# Patient Record
Sex: MALE | Race: WHITE | NOT HISPANIC OR LATINO
[De-identification: names, ages, dates, MRNs, and addresses within clinical notes are randomized per-mention and may not be internally consistent; named-entity substitution may affect disease eponyms.]

---

## 2018-08-01 ENCOUNTER — APPOINTMENT (OUTPATIENT)
Dept: UROLOGY | Facility: CLINIC | Age: 58
End: 2018-08-01
Payer: COMMERCIAL

## 2018-08-01 VITALS
BODY MASS INDEX: 30.1 KG/M2 | HEIGHT: 71 IN | HEART RATE: 69 BPM | DIASTOLIC BLOOD PRESSURE: 85 MMHG | SYSTOLIC BLOOD PRESSURE: 130 MMHG | WEIGHT: 215 LBS

## 2018-08-01 DIAGNOSIS — Z78.9 OTHER SPECIFIED HEALTH STATUS: ICD-10-CM

## 2018-08-01 PROCEDURE — 99213 OFFICE O/P EST LOW 20 MIN: CPT

## 2019-04-24 PROBLEM — Z00.00 ENCOUNTER FOR PREVENTIVE HEALTH EXAMINATION: Noted: 2019-04-24

## 2019-09-04 ENCOUNTER — APPOINTMENT (OUTPATIENT)
Dept: CARDIOLOGY | Facility: CLINIC | Age: 59
End: 2019-09-04
Payer: COMMERCIAL

## 2019-09-04 PROCEDURE — 93000 ELECTROCARDIOGRAM COMPLETE: CPT

## 2019-09-04 PROCEDURE — 99214 OFFICE O/P EST MOD 30 MIN: CPT

## 2019-09-10 ENCOUNTER — APPOINTMENT (OUTPATIENT)
Dept: UROLOGY | Facility: CLINIC | Age: 59
End: 2019-09-10
Payer: COMMERCIAL

## 2019-09-10 VITALS
SYSTOLIC BLOOD PRESSURE: 131 MMHG | BODY MASS INDEX: 29.4 KG/M2 | WEIGHT: 210 LBS | HEART RATE: 75 BPM | HEIGHT: 71 IN | DIASTOLIC BLOOD PRESSURE: 71 MMHG

## 2019-09-10 PROCEDURE — 99213 OFFICE O/P EST LOW 20 MIN: CPT

## 2019-09-10 NOTE — PHYSICAL EXAM
[General Appearance - In No Acute Distress] : no acute distress [Prostate Tenderness] : the prostate was not tender [No Prostate Nodules] : no prostate nodules [Prostate Size ___ (0-4)] : prostate size [unfilled] (scale: 0-4) [] : no respiratory distress [Respiration, Rhythm And Depth] : normal respiratory rhythm and effort [Oriented To Time, Place, And Person] : oriented to person, place, and time [Affect] : the affect was normal [Mood] : the mood was normal [Normal Station and Gait] : the gait and station were normal for the patient's age

## 2019-09-10 NOTE — REVIEW OF SYSTEMS
[Fever] : no fever [Chest Pain] : no chest pain [Shortness Of Breath] : no shortness of breath [Abdominal Pain] : no abdominal pain [Dizziness] : no dizziness [Dysuria] : no dysuria

## 2019-12-20 ENCOUNTER — OUTPATIENT (OUTPATIENT)
Dept: OUTPATIENT SERVICES | Facility: HOSPITAL | Age: 59
LOS: 1 days | Discharge: HOME | End: 2019-12-20
Payer: COMMERCIAL

## 2019-12-20 DIAGNOSIS — F17.200 NICOTINE DEPENDENCE, UNSPECIFIED, UNCOMPLICATED: ICD-10-CM

## 2019-12-20 PROCEDURE — 71250 CT THORAX DX C-: CPT | Mod: 26

## 2020-04-08 ENCOUNTER — APPOINTMENT (OUTPATIENT)
Dept: CARDIOLOGY | Facility: CLINIC | Age: 60
End: 2020-04-08
Payer: COMMERCIAL

## 2020-04-08 ENCOUNTER — APPOINTMENT (OUTPATIENT)
Dept: CARDIOLOGY | Facility: CLINIC | Age: 60
End: 2020-04-08

## 2020-04-08 PROCEDURE — 99213 OFFICE O/P EST LOW 20 MIN: CPT | Mod: 95

## 2020-07-28 ENCOUNTER — RECORD ABSTRACTING (OUTPATIENT)
Age: 60
End: 2020-07-28

## 2020-07-28 DIAGNOSIS — R07.89 OTHER CHEST PAIN: ICD-10-CM

## 2020-07-28 DIAGNOSIS — Z78.9 OTHER SPECIFIED HEALTH STATUS: ICD-10-CM

## 2020-07-28 DIAGNOSIS — I65.23 OCCLUSION AND STENOSIS OF BILATERAL CAROTID ARTERIES: ICD-10-CM

## 2020-08-26 ENCOUNTER — APPOINTMENT (OUTPATIENT)
Dept: CARDIOLOGY | Facility: CLINIC | Age: 60
End: 2020-08-26
Payer: COMMERCIAL

## 2020-08-26 VITALS
DIASTOLIC BLOOD PRESSURE: 78 MMHG | HEART RATE: 51 BPM | TEMPERATURE: 98.4 F | HEIGHT: 71 IN | WEIGHT: 217 LBS | OXYGEN SATURATION: 96 % | BODY MASS INDEX: 30.38 KG/M2 | SYSTOLIC BLOOD PRESSURE: 110 MMHG | RESPIRATION RATE: 16 BRPM

## 2020-08-26 PROCEDURE — 99214 OFFICE O/P EST MOD 30 MIN: CPT

## 2020-08-26 PROCEDURE — 93000 ELECTROCARDIOGRAM COMPLETE: CPT

## 2020-08-26 NOTE — HISTORY OF PRESENT ILLNESS
[FreeTextEntry1] : 59 year old male came in foron going evaluation of hypertension and hyperlipidemia. Patient denies chest pains or dyspnea.\par \par \par PMH is signfiicant for:\par \par HTN - controlled\par Hyperlipidemia - controlled\par \par Medications reviewed and reconciled with patient.  Patient is compliant with taking appropriate medications at appropriate doses at appropriate intervals without missing doses.\par

## 2020-08-26 NOTE — DISCUSSION/SUMMARY
[___ Month(s)] : [unfilled] month(s) [With Me] : with me [FreeTextEntry1] : Ekg results were reviewed.\par Recent lab results were reviewed.\par \par Fasting CBC, BMP, LFTs, Lipid Profile, HgbA1c, CRP, UA, Urine Microalbumin, Mg, Ca, TSH, T3, T4 prior to next visit\par f/u 6 months\par

## 2020-08-26 NOTE — PHYSICAL EXAM
[Normal Appearance] : normal appearance [General Appearance - Well Developed] : well developed [Well Groomed] : well groomed [General Appearance - Well Nourished] : well nourished [No Deformities] : no deformities [General Appearance - In No Acute Distress] : no acute distress [Normal Conjunctiva] : the conjunctiva exhibited no abnormalities [Eyelids - No Xanthelasma] : the eyelids demonstrated no xanthelasmas [Normal Oral Mucosa] : normal oral mucosa [No Oral Pallor] : no oral pallor [No Oral Cyanosis] : no oral cyanosis [Normal Jugular Venous A Waves Present] : normal jugular venous A waves present [Normal Jugular Venous V Waves Present] : normal jugular venous V waves present [No Jugular Venous Berry A Waves] : no jugular venous berry A waves [Respiration, Rhythm And Depth] : normal respiratory rhythm and effort [Exaggerated Use Of Accessory Muscles For Inspiration] : no accessory muscle use [5th Left ICS - MCL] : palpated at the 5th LICS in the midclavicular line [Auscultation Breath Sounds / Voice Sounds] : lungs were clear to auscultation bilaterally [Normal Rate] : normal [Normal S1] : normal S1 [Normal S2] : normal S2 [No Murmur] : no murmurs heard [2+] : left 2+ [No Pitting Edema] : no pitting edema present [No Abnormalities] : the abdominal aorta was not enlarged and no bruit was heard [Abdomen Tenderness] : non-tender [Abdomen Soft] : soft [Abdomen Mass (___ Cm)] : no abdominal mass palpated [Gait - Sufficient For Exercise Testing] : the gait was sufficient for exercise testing [Abnormal Walk] : normal gait [Nail Clubbing] : no clubbing of the fingernails [Petechial Hemorrhages (___cm)] : no petechial hemorrhages [Cyanosis, Localized] : no localized cyanosis [] : no rash [Skin Color & Pigmentation] : normal skin color and pigmentation [No Venous Stasis] : no venous stasis [Skin Lesions] : no skin lesions [No Skin Ulcers] : no skin ulcer [Oriented To Time, Place, And Person] : oriented to person, place, and time [Affect] : the affect was normal [No Xanthoma] : no  xanthoma was observed [Mood] : the mood was normal [No Anxiety] : not feeling anxious [Right Carotid Bruit] : no bruit heard over the right carotid [S4] : no S4 [S3] : no S3 [Left Carotid Bruit] : no bruit heard over the left carotid [Right Femoral Bruit] : no bruit heard over the right femoral artery [Left Femoral Bruit] : no bruit heard over the left femoral artery

## 2020-09-15 ENCOUNTER — APPOINTMENT (OUTPATIENT)
Dept: UROLOGY | Facility: CLINIC | Age: 60
End: 2020-09-15
Payer: COMMERCIAL

## 2020-09-15 VITALS — HEIGHT: 71 IN | TEMPERATURE: 97.6 F | WEIGHT: 218 LBS | BODY MASS INDEX: 30.52 KG/M2

## 2020-09-15 PROCEDURE — 99213 OFFICE O/P EST LOW 20 MIN: CPT

## 2020-09-15 NOTE — HISTORY OF PRESENT ILLNESS
[None] : None [FreeTextEntry1] : 60 year old male with a history of BPH. Presents to the office for a yearly follow up and prostate check. Last year PSA was 0.6 ng/ml. Voiding with a good flow, satisfied with urinary condition. Nocturia 1-2  xa night depending on his late fluid intake. Denies gross hematuria, dysuria, flank pain, fever, or chills.

## 2020-09-15 NOTE — PHYSICAL EXAM
[General Appearance - Well Developed] : well developed [General Appearance - Well Nourished] : well nourished [Normal Appearance] : normal appearance [Well Groomed] : well groomed [General Appearance - In No Acute Distress] : no acute distress [Abdomen Soft] : soft [Abdomen Tenderness] : non-tender [Costovertebral Angle Tenderness] : no ~M costovertebral angle tenderness [Edema] : no peripheral edema [] : no respiratory distress [Respiration, Rhythm And Depth] : normal respiratory rhythm and effort [Exaggerated Use Of Accessory Muscles For Inspiration] : no accessory muscle use [Affect] : the affect was normal [Oriented To Time, Place, And Person] : oriented to person, place, and time [Mood] : the mood was normal [Not Anxious] : not anxious [Normal Station and Gait] : the gait and station were normal for the patient's age [No Focal Deficits] : no focal deficits [Prostate Tenderness] : the prostate was not tender [No Prostate Nodules] : no prostate nodules [Prostate Size ___ (0-4)] : prostate size [unfilled] (scale: 0-4)

## 2020-09-15 NOTE — ASSESSMENT
[FreeTextEntry1] : Stable BPH requiring no treatment. Will get a new PSA, patient will call for results. Follow up in 1 year.

## 2021-02-25 ENCOUNTER — APPOINTMENT (OUTPATIENT)
Dept: CARDIOLOGY | Facility: CLINIC | Age: 61
End: 2021-02-25

## 2021-03-25 ENCOUNTER — APPOINTMENT (OUTPATIENT)
Dept: CARDIOLOGY | Facility: CLINIC | Age: 61
End: 2021-03-25
Payer: COMMERCIAL

## 2021-03-25 VITALS — DIASTOLIC BLOOD PRESSURE: 80 MMHG | SYSTOLIC BLOOD PRESSURE: 120 MMHG

## 2021-03-25 VITALS
BODY MASS INDEX: 30.24 KG/M2 | HEART RATE: 55 BPM | WEIGHT: 216 LBS | OXYGEN SATURATION: 98 % | DIASTOLIC BLOOD PRESSURE: 90 MMHG | SYSTOLIC BLOOD PRESSURE: 130 MMHG | TEMPERATURE: 98.2 F | RESPIRATION RATE: 16 BRPM | HEIGHT: 71 IN

## 2021-03-25 PROCEDURE — 93000 ELECTROCARDIOGRAM COMPLETE: CPT

## 2021-03-25 PROCEDURE — 99072 ADDL SUPL MATRL&STAF TM PHE: CPT

## 2021-03-25 PROCEDURE — 99213 OFFICE O/P EST LOW 20 MIN: CPT

## 2021-03-25 NOTE — DISCUSSION/SUMMARY
[___ Month(s)] : [unfilled] month(s) [With Me] : with me [FreeTextEntry1] : Ekg results were reviewed.\par Recent lab results were reviewed.\par \par Fasting CBC, BMP, LFTs, Lipid Profile, HgbA1c, CRP, UA, Urine Microalbumin, Mg, Ca, TSH, T3, T4 prior to next visit\par f/u 6 months\par \par Number/complexity of problems -  Mod - 2 or more stable chronic illnesses\par Amount/complexity of data -history, exam, reviewed old note, labs reviewed, ekg reviewed\par Risk of complications - Low\par

## 2021-03-25 NOTE — PHYSICAL EXAM
[General Appearance - Well Developed] : well developed [Normal Appearance] : normal appearance [Well Groomed] : well groomed [General Appearance - Well Nourished] : well nourished [No Deformities] : no deformities [General Appearance - In No Acute Distress] : no acute distress [Normal Conjunctiva] : the conjunctiva exhibited no abnormalities [Eyelids - No Xanthelasma] : the eyelids demonstrated no xanthelasmas [Normal Oral Mucosa] : normal oral mucosa [No Oral Pallor] : no oral pallor [No Oral Cyanosis] : no oral cyanosis [Normal Jugular Venous A Waves Present] : normal jugular venous A waves present [Normal Jugular Venous V Waves Present] : normal jugular venous V waves present [No Jugular Venous Berry A Waves] : no jugular venous berry A waves [Respiration, Rhythm And Depth] : normal respiratory rhythm and effort [Exaggerated Use Of Accessory Muscles For Inspiration] : no accessory muscle use [Auscultation Breath Sounds / Voice Sounds] : lungs were clear to auscultation bilaterally [Abdomen Soft] : soft [Abdomen Tenderness] : non-tender [Abdomen Mass (___ Cm)] : no abdominal mass palpated [Abnormal Walk] : normal gait [Gait - Sufficient For Exercise Testing] : the gait was sufficient for exercise testing [Nail Clubbing] : no clubbing of the fingernails [Cyanosis, Localized] : no localized cyanosis [Petechial Hemorrhages (___cm)] : no petechial hemorrhages [Skin Color & Pigmentation] : normal skin color and pigmentation [] : no rash [No Venous Stasis] : no venous stasis [Skin Lesions] : no skin lesions [No Skin Ulcers] : no skin ulcer [No Xanthoma] : no  xanthoma was observed [Oriented To Time, Place, And Person] : oriented to person, place, and time [Affect] : the affect was normal [Mood] : the mood was normal [No Anxiety] : not feeling anxious [5th Left ICS - MCL] : palpated at the 5th LICS in the midclavicular line [Normal Rate] : normal [Normal S1] : normal S1 [Normal S2] : normal S2 [S3] : no S3 [S4] : no S4 [No Murmur] : no murmurs heard [Right Carotid Bruit] : no bruit heard over the right carotid [Left Carotid Bruit] : no bruit heard over the left carotid [Right Femoral Bruit] : no bruit heard over the right femoral artery [Left Femoral Bruit] : no bruit heard over the left femoral artery [2+] : left 2+ [No Abnormalities] : the abdominal aorta was not enlarged and no bruit was heard [No Pitting Edema] : no pitting edema present [Heart Rate And Rhythm] : heart rate and rhythm were normal [Heart Sounds] : normal S1 and S2 [Murmurs] : no murmurs present [Arterial Pulses Normal] : the arterial pulses were normal [Edema] : no peripheral edema present

## 2021-03-25 NOTE — HISTORY OF PRESENT ILLNESS
[FreeTextEntry1] : 60 year old male came in foron going evaluation of hypertension and hyperlipidemia. Patient denies chest pains or dyspnea.\par \par \par PMH is signfiicant for:\par \par HTN - controlled\par Hyperlipidemia - controlled\par \par Medications reviewed and reconciled with patient.  Patient is compliant with taking appropriate medications at appropriate doses at appropriate intervals without missing doses.\par

## 2021-04-01 ENCOUNTER — RX RENEWAL (OUTPATIENT)
Age: 61
End: 2021-04-01

## 2021-04-16 ENCOUNTER — APPOINTMENT (OUTPATIENT)
Dept: ORTHOPEDIC SURGERY | Facility: CLINIC | Age: 61
End: 2021-04-16
Payer: COMMERCIAL

## 2021-04-16 VITALS — TEMPERATURE: 98.1 F

## 2021-04-16 DIAGNOSIS — M17.12 UNILATERAL PRIMARY OSTEOARTHRITIS, LEFT KNEE: ICD-10-CM

## 2021-04-16 DIAGNOSIS — M25.561 PAIN IN RIGHT KNEE: ICD-10-CM

## 2021-04-16 DIAGNOSIS — E06.9 THYROIDITIS, UNSPECIFIED: ICD-10-CM

## 2021-04-16 DIAGNOSIS — M94.261 CHONDROMALACIA, RIGHT KNEE: ICD-10-CM

## 2021-04-16 DIAGNOSIS — M94.262 CHONDROMALACIA, LEFT KNEE: ICD-10-CM

## 2021-04-16 DIAGNOSIS — M25.562 PAIN IN LEFT KNEE: ICD-10-CM

## 2021-04-16 DIAGNOSIS — M17.11 UNILATERAL PRIMARY OSTEOARTHRITIS, RIGHT KNEE: ICD-10-CM

## 2021-04-16 PROCEDURE — 73564 X-RAY EXAM KNEE 4 OR MORE: CPT | Mod: LT

## 2021-04-16 PROCEDURE — 99072 ADDL SUPL MATRL&STAF TM PHE: CPT

## 2021-04-16 PROCEDURE — 99203 OFFICE O/P NEW LOW 30 MIN: CPT

## 2021-04-16 NOTE — ASSESSMENT
[FreeTextEntry1] : 60 year old male presents to the office complaining of bilateral knee, right worse than the left. The pain is intermittent. It occurs with prolonged standing and going up and down the stairs. His job requires him to be on his feet all day and to do a lot of stair climbing. By the end of the day his knees ache. He prefers to not take prescription medications or to have injections. I do feel that a majority of his symptoms are coming from PF joint. He does have some crepitus there. I think conditioning of his lower extremities especially quads would be beneficial and he can use Turmeric as his NSAID.

## 2021-04-16 NOTE — PHYSICAL EXAM
[de-identified] : Radiographs done today AP lateral and skyline of both knees shows slight narrowing of the lateral compartment of both knees.  [de-identified] : General appearance: well nourished and hydrated, pleasant, alert and oriented x 3, cooperative.\par Cardiovascular: no apparent abnormalities, no lower leg edema, no varicosities, pedal pulses are palpable.\par Neurologic: sensation is normal, no muscle weakness in upper or lower extremities\par Dermatologic no apparent skin lesions, moist, warm, no rash.\par Gait: nonantalgic.\par \par Left knee\par Inspection: no effusion or erythema.\par Wounds: none.\par Alignment: normal.\par Palpation: no specific tenderness on palpation.\par ROM: 0-120 degrees, mild PF crepitus\par Ligamentous laxity: all ligaments appear stable\par Muscle Test: good quad strength.\par \par Right knee\par Inspection: no effusion or erythema.\par Wounds: none.\par Alignment: normal.\par Palpation: no specific tenderness on palpation.\par ROM: 0-120 degrees, mild PF crepitus\par Ligamentous laxity: all ligaments appear stable\par Muscle Test: good quad strength.\par \par Left hip\par Inspection: No swelling or ecchymosis.\par Wounds: none.\par Palpation: non-tender.\par Stability: no instability.\par Strength: 5/5 all motor groups.\par ROM: no pain with FROM.\par Leg length: equal.\par \par Right hip\par Inspection: No swelling or ecchymosis.\par Wounds: none.\par Palpation: non-tender.\par Stability: no instability.\par Strength: 5/5 all motor groups.\par ROM: no pain with FROM.\par Leg length: equal.\par

## 2021-04-16 NOTE — HISTORY OF PRESENT ILLNESS
[Pain Location] : pain [] : right knee [Worsening] : worsening [9] : a maximum pain level of 9/10 [Walking] : walking [Standing] : standing [Daily] : ~He/She~ states the symptoms seem to be occuring daily [de-identified] : 60 year old male presents to the office today complaining of bilateral knee pain x several years, right knee more painful than left. Patient reports pain that is achy and dull in nature. He reports noticing swelling in the right knee, along with buckling, and clicking in both knees. Patient reports doing okay with ambulation. Pain is mostly with prolonged standing and negotiating stairs. Patient states descending stairs is more difficult than ascending. There is also pain with prolonged sitting to standing. Patient did see another orthopedist and was prescribed Duexis but did not take this. Patient reports taking Aspirin for pain with only some relief. Patient reports having arthroscopies in both of her knees in the 1990s. Patient is here today to discuss his next options for pain relief.

## 2021-05-21 ENCOUNTER — APPOINTMENT (OUTPATIENT)
Dept: ORTHOPEDIC SURGERY | Facility: CLINIC | Age: 61
End: 2021-05-21

## 2021-10-12 ENCOUNTER — APPOINTMENT (OUTPATIENT)
Dept: UROLOGY | Facility: CLINIC | Age: 61
End: 2021-10-12

## 2021-11-01 ENCOUNTER — APPOINTMENT (OUTPATIENT)
Dept: CARDIOLOGY | Facility: CLINIC | Age: 61
End: 2021-11-01
Payer: COMMERCIAL

## 2021-11-01 VITALS
SYSTOLIC BLOOD PRESSURE: 120 MMHG | OXYGEN SATURATION: 96 % | HEIGHT: 71 IN | RESPIRATION RATE: 16 BRPM | TEMPERATURE: 97.4 F | WEIGHT: 219 LBS | BODY MASS INDEX: 30.66 KG/M2 | DIASTOLIC BLOOD PRESSURE: 84 MMHG | HEART RATE: 50 BPM

## 2021-11-01 PROCEDURE — 99213 OFFICE O/P EST LOW 20 MIN: CPT

## 2021-11-01 PROCEDURE — 93000 ELECTROCARDIOGRAM COMPLETE: CPT

## 2021-11-01 RX ORDER — HYDROCHLOROTHIAZIDE 12.5 MG/1
12.5 CAPSULE ORAL DAILY
Refills: 0 | Status: DISCONTINUED | COMMUNITY
End: 2021-11-01

## 2021-11-01 NOTE — DISCUSSION/SUMMARY
[With Me] : with me [___ Year(s)] : in [unfilled] year(s) [FreeTextEntry1] : Last visit note reviewed.\par Ekg results were reviewed.\par Recent lab results were reviewed.\par \par Fasting CBC, BMP, LFTs, Lipid Profile, HgbA1c, CRP, UA, Urine Microalbumin, Mg, Ca, TSH, T3, T4 prior to next visit\par f/u 1 year\par \par Number/complexity of problems -  Mod - 2 or more stable chronic illnesses\par Amount/complexity of data -history, exam, reviewed old note, labs reviewed, ekg reviewed\par Risk of complications - Low\par

## 2021-11-01 NOTE — PHYSICAL EXAM
[General Appearance - Well Developed] : well developed [Normal Appearance] : normal appearance [Well Groomed] : well groomed [General Appearance - Well Nourished] : well nourished [No Deformities] : no deformities [General Appearance - In No Acute Distress] : no acute distress [Normal Conjunctiva] : the conjunctiva exhibited no abnormalities [Eyelids - No Xanthelasma] : the eyelids demonstrated no xanthelasmas [Normal Oral Mucosa] : normal oral mucosa [No Oral Pallor] : no oral pallor [No Oral Cyanosis] : no oral cyanosis [Normal Jugular Venous A Waves Present] : normal jugular venous A waves present [Normal Jugular Venous V Waves Present] : normal jugular venous V waves present [No Jugular Venous Berry A Waves] : no jugular venous berry A waves [Respiration, Rhythm And Depth] : normal respiratory rhythm and effort [Exaggerated Use Of Accessory Muscles For Inspiration] : no accessory muscle use [Auscultation Breath Sounds / Voice Sounds] : lungs were clear to auscultation bilaterally [Heart Rate And Rhythm] : heart rate and rhythm were normal [Heart Sounds] : normal S1 and S2 [Murmurs] : no murmurs present [Arterial Pulses Normal] : the arterial pulses were normal [Edema] : no peripheral edema present [Abdomen Soft] : soft [Abdomen Tenderness] : non-tender [Abdomen Mass (___ Cm)] : no abdominal mass palpated [Abnormal Walk] : normal gait [Gait - Sufficient For Exercise Testing] : the gait was sufficient for exercise testing [Nail Clubbing] : no clubbing of the fingernails [Cyanosis, Localized] : no localized cyanosis [Petechial Hemorrhages (___cm)] : no petechial hemorrhages [Skin Color & Pigmentation] : normal skin color and pigmentation [] : no rash [No Venous Stasis] : no venous stasis [Skin Lesions] : no skin lesions [No Skin Ulcers] : no skin ulcer [No Xanthoma] : no  xanthoma was observed [Oriented To Time, Place, And Person] : oriented to person, place, and time [Affect] : the affect was normal [Mood] : the mood was normal [No Anxiety] : not feeling anxious

## 2021-11-02 ENCOUNTER — TRANSCRIPTION ENCOUNTER (OUTPATIENT)
Age: 61
End: 2021-11-02

## 2021-12-22 ENCOUNTER — APPOINTMENT (OUTPATIENT)
Dept: UROLOGY | Facility: CLINIC | Age: 61
End: 2021-12-22
Payer: COMMERCIAL

## 2021-12-22 VITALS — TEMPERATURE: 97.3 F | HEIGHT: 71 IN | WEIGHT: 219 LBS | BODY MASS INDEX: 30.66 KG/M2

## 2021-12-22 PROCEDURE — 99213 OFFICE O/P EST LOW 20 MIN: CPT

## 2021-12-22 NOTE — ASSESSMENT
[FreeTextEntry1] : Discussed behavioral modifications with patient including decreasing caffeine intake and attempt to decrease urinary frequency.  Return to office 1 year and sooner if necessary.  We will get new PSA and okay for yearly follow-up provided PSA okay

## 2021-12-22 NOTE — HISTORY OF PRESENT ILLNESS
[FreeTextEntry1] : 61-year-old with history of BPH.  PSA 1 year ago was 0.6.  He continues to void with good urinary flow but is bothered by increased urinary frequency in the daytime and nocturia x2.  No recent PSA.  He drinks a large amount of caffeinated beverages including coffee and espresso.  He does not drink an large amount of evening water.

## 2022-03-30 ENCOUNTER — RX RENEWAL (OUTPATIENT)
Age: 62
End: 2022-03-30

## 2022-04-09 ENCOUNTER — RX RENEWAL (OUTPATIENT)
Age: 62
End: 2022-04-09

## 2022-11-07 ENCOUNTER — APPOINTMENT (OUTPATIENT)
Dept: CARDIOLOGY | Facility: CLINIC | Age: 62
End: 2022-11-07

## 2022-11-07 VITALS
HEIGHT: 71 IN | SYSTOLIC BLOOD PRESSURE: 130 MMHG | DIASTOLIC BLOOD PRESSURE: 90 MMHG | WEIGHT: 215 LBS | HEART RATE: 61 BPM | BODY MASS INDEX: 30.1 KG/M2 | TEMPERATURE: 97.6 F | RESPIRATION RATE: 16 BRPM

## 2022-11-07 DIAGNOSIS — Z78.9 OTHER SPECIFIED HEALTH STATUS: ICD-10-CM

## 2022-11-07 PROCEDURE — 99213 OFFICE O/P EST LOW 20 MIN: CPT | Mod: 25

## 2022-11-07 PROCEDURE — 93000 ELECTROCARDIOGRAM COMPLETE: CPT

## 2022-11-07 RX ORDER — ASPIRIN 81 MG
81 TABLET, DELAYED RELEASE (ENTERIC COATED) ORAL DAILY
Refills: 0 | Status: DISCONTINUED | COMMUNITY
End: 2022-11-07

## 2022-11-07 NOTE — ASSESSMENT
[FreeTextEntry1] : Mr. Zazueta is a 62-year-old man with history of hypertension, hyperlipidemia, and BPH presenting for follow up.\par \par Impression:\par (1) HTN, well controlled.\par (2) HLD, well controlled\par \par Plan:\par - Discussed results of all imaging studies and labs from the past few years.\par - Patient is interested in stopping antihypertensives. Agreed to a trial of HCTZ discontinuation with daily BP monitoring. Resume if BP is >130/90. Patient to call if he has any questions.\par - Discussed recent guideline change for aspirin use in primary prevention of ASCVD. Patient will discontinue aspirin.\par \par RTC 12 months or sooner as needed

## 2022-11-07 NOTE — HISTORY OF PRESENT ILLNESS
[FreeTextEntry1] : Mr. Zazueta is a 62-year-old man with history of hypertension, hyperlipidemia, and BPH presenting for follow up.\par \par Patient previously followed with Dr. Ramírez and was last seen in office 11/2021.\par Today, feels well, denies active cardiopulmonary complaints.\par \par Exercise Stress Echo 2017\par - Normal resting study, no ischemia.\par \par Carotid doppler 2017\par - Mild bilateral ICA stenoses <50%\par \par Labs:\par - , HDL 48, TG 87, LDL 73, non-HDL 90\par - hsCRP 1.2, A1c 5.4%, Cr 0.8, K 4.6, TSH 0.47

## 2022-12-06 ENCOUNTER — APPOINTMENT (OUTPATIENT)
Dept: UROLOGY | Facility: CLINIC | Age: 62
End: 2022-12-06

## 2022-12-06 VITALS
SYSTOLIC BLOOD PRESSURE: 140 MMHG | DIASTOLIC BLOOD PRESSURE: 80 MMHG | HEIGHT: 61 IN | BODY MASS INDEX: 41.16 KG/M2 | WEIGHT: 218 LBS

## 2022-12-06 PROCEDURE — 99213 OFFICE O/P EST LOW 20 MIN: CPT

## 2022-12-06 NOTE — HISTORY OF PRESENT ILLNESS
[FreeTextEntry1] : 62-year-old with BPH.  PSA 1 year ago 0.6.  He continues good urinary flow but continues to have nocturia x2 but has not modified his caffeine intake including coffee and espresso.

## 2022-12-31 ENCOUNTER — TRANSCRIPTION ENCOUNTER (OUTPATIENT)
Age: 62
End: 2022-12-31

## 2022-12-31 ENCOUNTER — INPATIENT (INPATIENT)
Facility: HOSPITAL | Age: 62
LOS: 0 days | Discharge: HOME | End: 2022-12-31
Attending: INTERNAL MEDICINE | Admitting: INTERNAL MEDICINE
Payer: COMMERCIAL

## 2022-12-31 VITALS — DIASTOLIC BLOOD PRESSURE: 90 MMHG | HEART RATE: 61 BPM | SYSTOLIC BLOOD PRESSURE: 159 MMHG | TEMPERATURE: 99 F

## 2022-12-31 VITALS
TEMPERATURE: 97 F | HEART RATE: 87 BPM | HEIGHT: 68 IN | OXYGEN SATURATION: 99 % | WEIGHT: 175.05 LBS | RESPIRATION RATE: 18 BRPM | SYSTOLIC BLOOD PRESSURE: 151 MMHG | DIASTOLIC BLOOD PRESSURE: 104 MMHG

## 2022-12-31 LAB
ALBUMIN SERPL ELPH-MCNC: 4 G/DL — SIGNIFICANT CHANGE UP (ref 3.5–5.2)
ALP SERPL-CCNC: 48 U/L — SIGNIFICANT CHANGE UP (ref 30–115)
ALT FLD-CCNC: 18 U/L — SIGNIFICANT CHANGE UP (ref 0–41)
ANION GAP SERPL CALC-SCNC: 11 MMOL/L — SIGNIFICANT CHANGE UP (ref 7–14)
AST SERPL-CCNC: 19 U/L — SIGNIFICANT CHANGE UP (ref 0–41)
BASOPHILS # BLD AUTO: 0.04 K/UL — SIGNIFICANT CHANGE UP (ref 0–0.2)
BASOPHILS NFR BLD AUTO: 0.7 % — SIGNIFICANT CHANGE UP (ref 0–1)
BILIRUB SERPL-MCNC: 0.3 MG/DL — SIGNIFICANT CHANGE UP (ref 0.2–1.2)
BUN SERPL-MCNC: 17 MG/DL — SIGNIFICANT CHANGE UP (ref 10–20)
CALCIUM SERPL-MCNC: 9.8 MG/DL — SIGNIFICANT CHANGE UP (ref 8.4–10.5)
CHLORIDE SERPL-SCNC: 104 MMOL/L — SIGNIFICANT CHANGE UP (ref 98–110)
CK MB CFR SERPL CALC: 6.7 NG/ML — HIGH (ref 0.6–6.3)
CK MB CFR SERPL CALC: 7.2 NG/ML — HIGH (ref 0.6–6.3)
CK SERPL-CCNC: 228 U/L — HIGH (ref 0–225)
CO2 SERPL-SCNC: 27 MMOL/L — SIGNIFICANT CHANGE UP (ref 17–32)
CREAT SERPL-MCNC: 1 MG/DL — SIGNIFICANT CHANGE UP (ref 0.7–1.5)
D DIMER BLD IA.RAPID-MCNC: <150 NG/ML DDU — SIGNIFICANT CHANGE UP
EGFR: 85 ML/MIN/1.73M2 — SIGNIFICANT CHANGE UP
EOSINOPHIL # BLD AUTO: 0.33 K/UL — SIGNIFICANT CHANGE UP (ref 0–0.7)
EOSINOPHIL NFR BLD AUTO: 5.4 % — SIGNIFICANT CHANGE UP (ref 0–8)
ERYTHROCYTE [SEDIMENTATION RATE] IN BLOOD: 7 MM/HR — SIGNIFICANT CHANGE UP (ref 0–10)
FLUAV AG NPH QL: SIGNIFICANT CHANGE UP
FLUBV AG NPH QL: SIGNIFICANT CHANGE UP
GLUCOSE SERPL-MCNC: 127 MG/DL — HIGH (ref 70–99)
HCT VFR BLD CALC: 40.6 % — LOW (ref 42–52)
HGB BLD-MCNC: 13.8 G/DL — LOW (ref 14–18)
IMM GRANULOCYTES NFR BLD AUTO: 0.3 % — SIGNIFICANT CHANGE UP (ref 0.1–0.3)
LYMPHOCYTES # BLD AUTO: 2.08 K/UL — SIGNIFICANT CHANGE UP (ref 1.2–3.4)
LYMPHOCYTES # BLD AUTO: 33.8 % — SIGNIFICANT CHANGE UP (ref 20.5–51.1)
MCHC RBC-ENTMCNC: 28.6 PG — SIGNIFICANT CHANGE UP (ref 27–31)
MCHC RBC-ENTMCNC: 34 G/DL — SIGNIFICANT CHANGE UP (ref 32–37)
MCV RBC AUTO: 84.1 FL — SIGNIFICANT CHANGE UP (ref 80–94)
MONOCYTES # BLD AUTO: 0.56 K/UL — SIGNIFICANT CHANGE UP (ref 0.1–0.6)
MONOCYTES NFR BLD AUTO: 9.1 % — SIGNIFICANT CHANGE UP (ref 1.7–9.3)
NEUTROPHILS # BLD AUTO: 3.12 K/UL — SIGNIFICANT CHANGE UP (ref 1.4–6.5)
NEUTROPHILS NFR BLD AUTO: 50.7 % — SIGNIFICANT CHANGE UP (ref 42.2–75.2)
NRBC # BLD: 0 /100 WBCS — SIGNIFICANT CHANGE UP (ref 0–0)
PLATELET # BLD AUTO: 172 K/UL — SIGNIFICANT CHANGE UP (ref 130–400)
POTASSIUM SERPL-MCNC: 4 MMOL/L — SIGNIFICANT CHANGE UP (ref 3.5–5)
POTASSIUM SERPL-SCNC: 4 MMOL/L — SIGNIFICANT CHANGE UP (ref 3.5–5)
PROT SERPL-MCNC: 6.5 G/DL — SIGNIFICANT CHANGE UP (ref 6–8)
RBC # BLD: 4.83 M/UL — SIGNIFICANT CHANGE UP (ref 4.7–6.1)
RBC # FLD: 12.9 % — SIGNIFICANT CHANGE UP (ref 11.5–14.5)
RSV RNA NPH QL NAA+NON-PROBE: SIGNIFICANT CHANGE UP
SARS-COV-2 RNA SPEC QL NAA+PROBE: SIGNIFICANT CHANGE UP
SODIUM SERPL-SCNC: 142 MMOL/L — SIGNIFICANT CHANGE UP (ref 135–146)
TROPONIN T SERPL-MCNC: <0.01 NG/ML — SIGNIFICANT CHANGE UP
WBC # BLD: 6.15 K/UL — SIGNIFICANT CHANGE UP (ref 4.8–10.8)
WBC # FLD AUTO: 6.15 K/UL — SIGNIFICANT CHANGE UP (ref 4.8–10.8)

## 2022-12-31 PROCEDURE — 93010 ELECTROCARDIOGRAM REPORT: CPT

## 2022-12-31 PROCEDURE — 71046 X-RAY EXAM CHEST 2 VIEWS: CPT | Mod: 26

## 2022-12-31 PROCEDURE — 99285 EMERGENCY DEPT VISIT HI MDM: CPT

## 2022-12-31 PROCEDURE — 99222 1ST HOSP IP/OBS MODERATE 55: CPT

## 2022-12-31 PROCEDURE — 99235 HOSP IP/OBS SAME DATE MOD 70: CPT

## 2022-12-31 RX ORDER — ASPIRIN/CALCIUM CARB/MAGNESIUM 324 MG
81 TABLET ORAL DAILY
Refills: 0 | Status: DISCONTINUED | OUTPATIENT
Start: 2022-12-31 | End: 2022-12-31

## 2022-12-31 RX ORDER — HYDROCHLOROTHIAZIDE 25 MG
1 TABLET ORAL
Qty: 0 | Refills: 0 | DISCHARGE
Start: 2022-12-31

## 2022-12-31 RX ORDER — METHOCARBAMOL 500 MG/1
1500 TABLET, FILM COATED ORAL ONCE
Refills: 0 | Status: COMPLETED | OUTPATIENT
Start: 2022-12-31 | End: 2022-12-31

## 2022-12-31 RX ORDER — ATORVASTATIN CALCIUM 80 MG/1
1 TABLET, FILM COATED ORAL
Qty: 0 | Refills: 0 | DISCHARGE

## 2022-12-31 RX ORDER — IBUPROFEN 200 MG
1 TABLET ORAL
Qty: 30 | Refills: 0
Start: 2022-12-31 | End: 2023-01-14

## 2022-12-31 RX ORDER — ONDANSETRON 8 MG/1
4 TABLET, FILM COATED ORAL EVERY 8 HOURS
Refills: 0 | Status: DISCONTINUED | OUTPATIENT
Start: 2022-12-31 | End: 2022-12-31

## 2022-12-31 RX ORDER — ACETAMINOPHEN 500 MG
650 TABLET ORAL EVERY 6 HOURS
Refills: 0 | Status: DISCONTINUED | OUTPATIENT
Start: 2022-12-31 | End: 2022-12-31

## 2022-12-31 RX ORDER — HYDROCHLOROTHIAZIDE 25 MG
1 TABLET ORAL
Qty: 0 | Refills: 0 | DISCHARGE

## 2022-12-31 RX ORDER — ATORVASTATIN CALCIUM 80 MG/1
1 TABLET, FILM COATED ORAL
Qty: 0 | Refills: 0 | DISCHARGE
Start: 2022-12-31

## 2022-12-31 RX ORDER — KETOROLAC TROMETHAMINE 30 MG/ML
15 SYRINGE (ML) INJECTION ONCE
Refills: 0 | Status: DISCONTINUED | OUTPATIENT
Start: 2022-12-31 | End: 2022-12-31

## 2022-12-31 RX ORDER — LANOLIN ALCOHOL/MO/W.PET/CERES
3 CREAM (GRAM) TOPICAL AT BEDTIME
Refills: 0 | Status: DISCONTINUED | OUTPATIENT
Start: 2022-12-31 | End: 2022-12-31

## 2022-12-31 RX ORDER — ATORVASTATIN CALCIUM 80 MG/1
20 TABLET, FILM COATED ORAL AT BEDTIME
Refills: 0 | Status: DISCONTINUED | OUTPATIENT
Start: 2022-12-31 | End: 2022-12-31

## 2022-12-31 RX ADMIN — Medication 650 MILLIGRAM(S): at 16:03

## 2022-12-31 RX ADMIN — METHOCARBAMOL 1500 MILLIGRAM(S): 500 TABLET, FILM COATED ORAL at 04:48

## 2022-12-31 RX ADMIN — Medication 15 MILLIGRAM(S): at 04:48

## 2022-12-31 RX ADMIN — Medication 81 MILLIGRAM(S): at 12:04

## 2022-12-31 RX ADMIN — Medication 650 MILLIGRAM(S): at 09:21

## 2022-12-31 NOTE — ED PROVIDER NOTE - OBJECTIVE STATEMENT
62 years old male history of hypertension, high cholesterol presenting complaint left-sided chest pain off and on over the past 3 weeks.  Pain is aching like.  Pain is gradually increased over the past week.  Feel more pain this evening so he comes to ED for evaluation.  Reports pain is improved while he try to stretch his chest but worse while sitting still.  Denies diaphoresis and shortness of breath associated chest pain.  Further denies numbness tingling  radiating down left arm.  Denies exogenous hormone use/recent hospitalization/hx of DVT. denies recent illness/fever/chill/HA/dizziness/sob/abd pain/n/v/d/urinary sxs.  denies hx of CAD. Had stress test in the past and it was normal.

## 2022-12-31 NOTE — H&P ADULT - NSHPLABSRESULTS_GEN_ALL_CORE
13.8   6.15  )-----------( 172      ( 31 Dec 2022 04:30 )             40.6       12-31    142  |  104  |  17  ----------------------------<  127<H>  4.0   |  27  |  1.0    Ca    9.8      31 Dec 2022 04:30    TPro  6.5  /  Alb  4.0  /  TBili  0.3  /  DBili  x   /  AST  19  /  ALT  18  /  AlkPhos  48  12-31          CARDIAC MARKERS ( 31 Dec 2022 04:30 )  x     / <0.01 ng/mL / x     / x     / x          EKG-no acute iscemic changes

## 2022-12-31 NOTE — DISCHARGE NOTE PROVIDER - CARE PROVIDERS DIRECT ADDRESSES
[email or web address] CONST: NAD  EYES: Sclera and conjunctiva clear.   ENT: No nasal discharge. Oropharynx normal appearing, no erythema or exudates. No abscess or swelling. Uvula midline.   NECK: Non-tender, no meningeal signs. normal ROM. supple   CARD: S1 S2; No jvd  RESP: Equal BS B/L, No wheezes, rhonchi or rales. No distress  GI: Soft, non-tender, non-distended. no cva tenderness. normal BS  MS: Normal ROM in all extremities. pulses 2 +. no calf tenderness or swelling  SKIN: Warm, dry, no acute rashes. Good turgor  NEURO: A&Ox3, No focal deficits. Strength 5/5 with no sensory deficits. Steady gait.

## 2022-12-31 NOTE — DISCHARGE NOTE PROVIDER - NSDCMRMEDTOKEN_GEN_ALL_CORE_FT
atorvastatin 20 mg oral tablet: 1 tab(s) orally once a day (at bedtime)  hydroCHLOROthiazide 12.5 mg oral capsule: 1 cap(s) orally once a day

## 2022-12-31 NOTE — H&P ADULT - HISTORY OF PRESENT ILLNESS
Patient is a 61yo M with a pmhx of HTN, hyperlipidemia, whom presents with c/o cp which began around 3am today described as severe pain at left anterior chest wall which is made worse with certain movements of his trunk, radiates into the posterolateral portion of chest, and rated moderate presently. Pt denies associated fever, sob, cough, chills, edema, or palpitations. Pt reports having sx for the last 3 weeks and having similar sx several years ago but never sought workup.

## 2022-12-31 NOTE — H&P ADULT - NSHPPHYSICALEXAM_GEN_ALL_CORE
Vital Signs Last 24 Hrs  T(F): 97.2 (31 Dec 2022 04:34), Max: 97.2 (31 Dec 2022 04:34)  HR: 71 (31 Dec 2022 04:34) (71 - 87)  BP: 168/84 (31 Dec 2022 04:34) (151/104 - 168/84)  RR: 18 (31 Dec 2022 04:34) (18 - 18)  SpO2: 96% (31 Dec 2022 04:34) (96% - 99%)    PHYSICAL EXAM:      Constitutional: A&Ox4  Respiratory: cta b/l  Thorax-no tednerness of chest wall  Cardiovascular: s1 s2 rrr  Gastrointestinal: soft nt  nd + bs no rebound or guarding  Genitourinary: no cva tenderness  Extremities: normal rom, no edema, calf tenderness  Neurological:no focal deficits  Skin: no rash

## 2022-12-31 NOTE — DISCHARGE NOTE PROVIDER - NSDCCPCAREPLAN_GEN_ALL_CORE_FT
PRINCIPAL DISCHARGE DIAGNOSIS  Diagnosis: Chest pain  Assessment and Plan of Treatment: Your EKG on presentation was negative, your troponins were negative for three occurences. Please follow up with cardiology for an outpatient Stress test and echocardiogram       PRINCIPAL DISCHARGE DIAGNOSIS  Diagnosis: Chest pain  Assessment and Plan of Treatment: Your EKG on presentation was negative, your troponins were negative for three occurences. Please follow up with cardiology for an outpatient Stress test and echocardiogram. Please take over the counter Advil for chest pain.

## 2022-12-31 NOTE — DISCHARGE NOTE NURSING/CASE MANAGEMENT/SOCIAL WORK - PATIENT PORTAL LINK FT
You can access the FollowMyHealth Patient Portal offered by [facility] by registering at the following website: [email or web address]. By joining [employer]’s FollowMyHealth portal, you will also be able to view your health information using other applications (apps) compatible with our system.

## 2022-12-31 NOTE — DISCHARGE NOTE PROVIDER - CARE PROVIDER_API CALL
Josiah Baez)  Cardiovascular Disease; Internal Medicine  [address] [place] [id number]  Phone: [phone]  Fax: [phone]  Follow Up Time:     Darell Lakhani)  Geriatric Medicine; Internal Medicine  [address] [place] [address]  Phone: [phone]  Fax: [phone]  Follow Up Time:

## 2022-12-31 NOTE — ED PROVIDER NOTE - CLINICAL SUMMARY MEDICAL DECISION MAKING FREE TEXT BOX
62-year-old male with a past medical history of hypertension, hyperlipidemia presents with left-sided chest pain intermittently for 3 weeks.  Nonexertional.  No shortness of breath.  Has been more constant for the past 1 to 2 days.  Nonradiating to his back.  Denies shortness of breath or cough.  No numbness to left arm.  No nausea or vomiting.  On exam, nontoxic, vital signs stable, heart regular no murmurs, lungs clear bilaterally, abdomen benign, no peripheral edema, 2+ radial and DP pulses bilaterally.  Work-up reassuring including troponin negative.  EKG stable on repeat.  Clinically well-appearing and nontoxic so doubt dissection.  No risk factors for PE.  Does have heart score of 4 so is high risk chest pain so will admit for ACS rule out.

## 2022-12-31 NOTE — CONSULT NOTE ADULT - ASSESSMENT
Patient is a 63yo M with a pmhx of HTN, hyperlipidemia, whom presents with c/o cp. Pain for several weeks,  Can last hours. Pain at times stabbing. Pain can be worse if move chest. Need r/o mi. Pain appears non cardiac, Check d-dimer esr. check cxr. Echo possible out patient.  Will need out patient stress echo or Adenocard thallium

## 2022-12-31 NOTE — ED PROVIDER NOTE - NS_EDPROVIDERDISPOUSERTYPE_ED_A_ED
Pt ambulated to the bathroom with a smooth and steady gait   Attending Attestation (For Attendings USE Only)...

## 2022-12-31 NOTE — H&P ADULT - NS ATTEND AMEND GEN_ALL_CORE FT
61yo M with a pmhx of HTN, hyperlipidemia, whom presents with c/o cp which began around 3am today described as severe pain at left anterior chest wall which is made worse with certain movements of his trunk, radiates into the posterolateral portion of chest, and rated moderate presently. Pt reports having sx for the last 3 weeks and having similar sx several years ago but never sought workup. The pain is not worse with ambulation or exertion and not better with rest. On physical exam, he is tender to L breast tissues and upon moving his arm, his pain is reproduced. Troponins negative x 3, D-dimer negative. Cardiology c/s may get outpatient Echo and outpatient Stress test.

## 2022-12-31 NOTE — CONSULT NOTE ADULT - SUBJECTIVE AND OBJECTIVE BOX
CARDIOLOGY CONSULT NOTE     CHIEF COMPLAINT/REASON FOR CONSULT:    HPI:  Patient is a 63yo M with a pmhx of HTN, hyperlipidemia, whom presents with c/o cp which began around 3am today described as severe pain at left anterior chest wall which is made worse with certain movements of his trunk, radiates into the posterolateral portion of chest, and rated moderate presently. Pt denies associated fever, sob, cough, chills, edema, or palpitations. Pt reports having sx for the last 3 weeks and having similar sx several years ago but never sought workup.  (31 Dec 2022 08:17)      PAST MEDICAL & SURGICAL HISTORY:  HTN (hypertension)      HLD (hyperlipidemia)          Cardiac Risks:   [xx ]HTN, [ ] DM, [ ] Smoking, [ ] FH,  [x ] Lipids        MEDICATIONS:  MEDICATIONS  (STANDING):  aspirin enteric coated 81 milliGRAM(s) Oral daily  atorvastatin 20 milliGRAM(s) Oral at bedtime  hydrochlorothiazide 12.5 milliGRAM(s) Oral daily      FAMILY HISTORY:      SOCIAL HISTORY:      [ ] Marital status    Allergies    No Known Allergies      	    REVIEW OF SYSTEMS:  CONSTITUTIONAL: No fever, weight loss, or fatigue  EYES: No eye pain, visual disturbances, or discharge  ENMT:  No difficulty hearing, tinnitus, vertigo; No sinus or throat pain  NECK: No pain or stiffness  RESPIRATORY: No cough, wheezing, chills or hemoptysis; No Shortness of Breath  CARDIOVASCULAR: See above  GASTROINTESTINAL: No abdominal or epigastric pain. No nausea, vomiting, or hematemesis; No diarrhea or constipation. No melena or hematochezia.  GENITOURINARY: No dysuria, frequency, hematuria, or incontinence  NEUROLOGICAL: No headaches, memory loss, loss of strength, numbness, or tremors  SKIN: No itching, burning, rashes, or lesions   	      PHYSICAL EXAM:  T(C): 36.2 (12-31-22 @ 04:34), Max: 36.2 (12-31-22 @ 03:57)  HR: 62 (12-31-22 @ 09:29) (62 - 87)  BP: 189/109 (12-31-22 @ 09:29) (151/104 - 189/109)  RR: 18 (12-31-22 @ 04:34) (18 - 18)  SpO2: 96% (12-31-22 @ 04:34) (96% - 99%)  Wt(kg): --  I&O's Summary      Appearance: Normal	  Psychiatry: A & O x 3, Mood & affect appropriate  HEENT:   Normal oral mucosa, PERRL, EOMI	  Lymphatic: No lymphadenopathy  Cardiovascular: Normal S1 S2,RRR, No JVD, No murmurs  Respiratory: Lungs clear to auscultation	  Gastrointestinal:  Soft, Non-tender, + BS	  Skin: No rashes, No ecchymoses, No cyanosis	  Neurologic: Non-focal  Extremities: Normal range of motion, No clubbing, cyanosis or edema  Vascular: Peripheral pulses palpable 2+ bilaterally      ECG:  	< from: 12 Lead ECG (12.31.22 @ 04:12) >    Diagnosis Line Sinus rhythm  Nonspecific ST abnormality  Abnormal ECG    Confirmed by ADAM ORTIZ MD ([id number]) on 12/31/2022 9:54:19 AM    < end of copied text >      	  LABS:	 	    CARDIAC MARKERS:                                    13.8   6.15  )-----------( 172      ( 31 Dec 2022 04:30 )             40.6     12-31    142  |  104  |  17  ----------------------------<  127<H>  4.0   |  27  |  1.0    Ca    9.8      31 Dec 2022 04:30    TPro  6.5  /  Alb  4.0  /  TBili  0.3  /  DBili  x   /  AST  19  /  ALT  18  /  AlkPhos  48  12-31

## 2022-12-31 NOTE — H&P ADULT - NSHPREVIEWOFSYSTEMS_GEN_ALL_CORE
General:	no fever, weight loss,  chills  Skin: no rash, ulcers  Ophthalmologic: no visual changes  ENMT:	no sore throat  Respiratory and Thorax: no cough, wheeze,  sob  Cardiovascular:	+ chest pain, no palpitations, dizziness  Gastrointestinal:	no nausea, vomiting, diarrhea, abd pain  Genitourinary:	no dysuria, hematuria  Musculoskeletal:	no joint pains  Neurological:	 no speech disturbance, focal weakness, numbness  Psychiatric:	no depression, anxiety, psychosis  Hematology/Lymphatics:	no anemia  Endocrine:	no polyuria, polydipsia

## 2022-12-31 NOTE — H&P ADULT - ASSESSMENT
Patient is a 63yo M with a pmhx of HTN, hyperlipidemia, whom presents with c/o cp which began around 3am today described as severe pain at left anterior chest wall which is made worse with certain movements of his trunk, radiates into the posterolateral portion of chest, and rated moderate presently. Pt denies associated fever, sob, cough, chills, edema, or palpitations. Pt reports having sx for the last 3 weeks and having similar sx several years ago but never sought workup. 1st troponin negative in ED and ekg no acute ischemic changes. Pt reports no relief from robaxin or toradol.     #chest pain r/o ACS  -admit to telemetry  -serial cardiac enzymes  -cardiology consult  -asa 81mg daily  -repeat ekg today and in am  -d/w DR REYES and cardiology NP    #HTN  -low sodium diet  -monitor bp  -cont meds from home hctz    #hyperlipidemia  -c/w lipitor  -dash diet    #DVT prophylaxis  -lovenox

## 2022-12-31 NOTE — PATIENT PROFILE ADULT - FALL HARM RISK - UNIVERSAL INTERVENTIONS
Bed in lowest position, wheels locked, appropriate side rails in place/Call bell, personal items and telephone in reach/Instruct patient to call for assistance before getting out of bed or chair/Non-slip footwear when patient is out of bed/[place] to call system/Physically safe environment - no spills, clutter or unnecessary equipment/Purposeful Proactive Rounding/Room/bathroom lighting operational, light cord in reach

## 2022-12-31 NOTE — DISCHARGE NOTE PROVIDER - HOSPITAL COURSE
Patient is a 63yo M with a pmhx of HTN, hyperlipidemia, whom presents with c/o cp which began around 3am today described as severe pain at left anterior chest wall which is made worse with certain movements of his trunk, radiates into the posterolateral portion of chest, and rated moderate presently. Pt denies associated palpitation, shortness of breath, fever,cough, chills, edema, or palpitations. Pt reports having sx for the last 3 weeks and having similar sx several years ago but never sought workup. The pain is not worse with ambulation or exertion and not better with rest. On physical exam, he is tender to L breast tissues and upon moving his arm. Troponins negative x 3, D-dimer negative. Cardiology c/s may get outpatient Echo and outpatient Stress test. Patient is a 63yo M with a pmhx of HTN, hyperlipidemia, whom presents with c/o cp which began around 3am today described as severe pain at left anterior chest wall which is made worse with certain movements of his trunk, radiates into the posterolateral portion of chest, and rated moderate presently. Pt denies associated palpitation, shortness of breath, fever,cough, chills, edema, or palpitations. Pt reports having sx for the last 3 weeks and having similar sx several years ago but never sought workup. The pain is not worse with ambulation or exertion and not better with rest. On physical exam, he is tender to L breast tissues and upon moving his arm reproduces the pain. Troponins negative x 3, D-dimer negative. Cardiology c/s may get outpatient Echo and outpatient Stress test.

## 2023-01-02 ENCOUNTER — INPATIENT (INPATIENT)
Facility: HOSPITAL | Age: 63
LOS: 1 days | Discharge: HOME | End: 2023-01-04
Attending: INTERNAL MEDICINE | Admitting: INTERNAL MEDICINE
Payer: COMMERCIAL

## 2023-01-02 VITALS
DIASTOLIC BLOOD PRESSURE: 103 MMHG | HEIGHT: 68 IN | TEMPERATURE: 96 F | WEIGHT: 220.02 LBS | SYSTOLIC BLOOD PRESSURE: 177 MMHG | OXYGEN SATURATION: 99 % | HEART RATE: 73 BPM | RESPIRATION RATE: 18 BRPM

## 2023-01-02 PROBLEM — E78.5 HYPERLIPIDEMIA, UNSPECIFIED: Chronic | Status: ACTIVE | Noted: 2022-12-31

## 2023-01-02 PROBLEM — I10 ESSENTIAL (PRIMARY) HYPERTENSION: Chronic | Status: ACTIVE | Noted: 2022-12-31

## 2023-01-02 LAB
ALBUMIN SERPL ELPH-MCNC: 4.9 G/DL — SIGNIFICANT CHANGE UP (ref 3.5–5.2)
ALP SERPL-CCNC: 52 U/L — SIGNIFICANT CHANGE UP (ref 30–115)
ALT FLD-CCNC: 19 U/L — SIGNIFICANT CHANGE UP (ref 0–41)
ANION GAP SERPL CALC-SCNC: 5 MMOL/L — LOW (ref 7–14)
AST SERPL-CCNC: 21 U/L — SIGNIFICANT CHANGE UP (ref 0–41)
BASOPHILS # BLD AUTO: 0.05 K/UL — SIGNIFICANT CHANGE UP (ref 0–0.2)
BASOPHILS NFR BLD AUTO: 0.8 % — SIGNIFICANT CHANGE UP (ref 0–1)
BILIRUB SERPL-MCNC: 0.6 MG/DL — SIGNIFICANT CHANGE UP (ref 0.2–1.2)
BUN SERPL-MCNC: 19 MG/DL — SIGNIFICANT CHANGE UP (ref 10–20)
CALCIUM SERPL-MCNC: 10.8 MG/DL — HIGH (ref 8.4–10.4)
CHLORIDE SERPL-SCNC: 101 MMOL/L — SIGNIFICANT CHANGE UP (ref 98–110)
CO2 SERPL-SCNC: 34 MMOL/L — HIGH (ref 17–32)
CREAT SERPL-MCNC: 0.9 MG/DL — SIGNIFICANT CHANGE UP (ref 0.7–1.5)
EGFR: 96 ML/MIN/1.73M2 — SIGNIFICANT CHANGE UP
EOSINOPHIL # BLD AUTO: 0.22 K/UL — SIGNIFICANT CHANGE UP (ref 0–0.7)
EOSINOPHIL NFR BLD AUTO: 3.3 % — SIGNIFICANT CHANGE UP (ref 0–8)
GLUCOSE SERPL-MCNC: 111 MG/DL — HIGH (ref 70–99)
HCT VFR BLD CALC: 45.3 % — SIGNIFICANT CHANGE UP (ref 42–52)
HGB BLD-MCNC: 15.3 G/DL — SIGNIFICANT CHANGE UP (ref 14–18)
IMM GRANULOCYTES NFR BLD AUTO: 0.2 % — SIGNIFICANT CHANGE UP (ref 0.1–0.3)
LIDOCAIN IGE QN: 51 U/L — SIGNIFICANT CHANGE UP (ref 7–60)
LYMPHOCYTES # BLD AUTO: 1.75 K/UL — SIGNIFICANT CHANGE UP (ref 1.2–3.4)
LYMPHOCYTES # BLD AUTO: 26.6 % — SIGNIFICANT CHANGE UP (ref 20.5–51.1)
MAGNESIUM SERPL-MCNC: 2 MG/DL — SIGNIFICANT CHANGE UP (ref 1.8–2.4)
MCHC RBC-ENTMCNC: 28.3 PG — SIGNIFICANT CHANGE UP (ref 27–31)
MCHC RBC-ENTMCNC: 33.8 G/DL — SIGNIFICANT CHANGE UP (ref 32–37)
MCV RBC AUTO: 83.7 FL — SIGNIFICANT CHANGE UP (ref 80–94)
MONOCYTES # BLD AUTO: 0.49 K/UL — SIGNIFICANT CHANGE UP (ref 0.1–0.6)
MONOCYTES NFR BLD AUTO: 7.5 % — SIGNIFICANT CHANGE UP (ref 1.7–9.3)
NEUTROPHILS # BLD AUTO: 4.05 K/UL — SIGNIFICANT CHANGE UP (ref 1.4–6.5)
NEUTROPHILS NFR BLD AUTO: 61.6 % — SIGNIFICANT CHANGE UP (ref 42.2–75.2)
NRBC # BLD: 0 /100 WBCS — SIGNIFICANT CHANGE UP (ref 0–0)
NT-PROBNP SERPL-SCNC: 52 PG/ML — SIGNIFICANT CHANGE UP (ref 0–300)
PLATELET # BLD AUTO: 194 K/UL — SIGNIFICANT CHANGE UP (ref 130–400)
POTASSIUM SERPL-MCNC: 4.1 MMOL/L — SIGNIFICANT CHANGE UP (ref 3.5–5)
POTASSIUM SERPL-SCNC: 4.1 MMOL/L — SIGNIFICANT CHANGE UP (ref 3.5–5)
PROT SERPL-MCNC: 7.2 G/DL — SIGNIFICANT CHANGE UP (ref 6–8)
RBC # BLD: 5.41 M/UL — SIGNIFICANT CHANGE UP (ref 4.7–6.1)
RBC # FLD: 12.8 % — SIGNIFICANT CHANGE UP (ref 11.5–14.5)
SARS-COV-2 RNA SPEC QL NAA+PROBE: SIGNIFICANT CHANGE UP
SODIUM SERPL-SCNC: 140 MMOL/L — SIGNIFICANT CHANGE UP (ref 135–146)
TROPONIN T SERPL-MCNC: <0.01 NG/ML — SIGNIFICANT CHANGE UP
WBC # BLD: 6.57 K/UL — SIGNIFICANT CHANGE UP (ref 4.8–10.8)
WBC # FLD AUTO: 6.57 K/UL — SIGNIFICANT CHANGE UP (ref 4.8–10.8)

## 2023-01-02 PROCEDURE — 93010 ELECTROCARDIOGRAM REPORT: CPT | Mod: 76

## 2023-01-02 PROCEDURE — 93010 ELECTROCARDIOGRAM REPORT: CPT | Mod: 77

## 2023-01-02 PROCEDURE — 75574 CT ANGIO HRT W/3D IMAGE: CPT | Mod: 26,MA

## 2023-01-02 PROCEDURE — 71046 X-RAY EXAM CHEST 2 VIEWS: CPT | Mod: 26

## 2023-01-02 PROCEDURE — 99236 HOSP IP/OBS SAME DATE HI 85: CPT

## 2023-01-02 RX ORDER — ASPIRIN/CALCIUM CARB/MAGNESIUM 324 MG
81 TABLET ORAL DAILY
Refills: 0 | Status: DISCONTINUED | OUTPATIENT
Start: 2023-01-02 | End: 2023-01-04

## 2023-01-02 RX ORDER — MORPHINE SULFATE 50 MG/1
4 CAPSULE, EXTENDED RELEASE ORAL ONCE
Refills: 0 | Status: DISCONTINUED | OUTPATIENT
Start: 2023-01-02 | End: 2023-01-02

## 2023-01-02 RX ORDER — MORPHINE SULFATE 50 MG/1
1 CAPSULE, EXTENDED RELEASE ORAL EVERY 6 HOURS
Refills: 0 | Status: DISCONTINUED | OUTPATIENT
Start: 2023-01-02 | End: 2023-01-04

## 2023-01-02 RX ORDER — ENOXAPARIN SODIUM 100 MG/ML
40 INJECTION SUBCUTANEOUS EVERY 24 HOURS
Refills: 0 | Status: DISCONTINUED | OUTPATIENT
Start: 2023-01-03 | End: 2023-01-03

## 2023-01-02 RX ORDER — ATORVASTATIN CALCIUM 80 MG/1
80 TABLET, FILM COATED ORAL AT BEDTIME
Refills: 0 | Status: DISCONTINUED | OUTPATIENT
Start: 2023-01-02 | End: 2023-01-03

## 2023-01-02 RX ORDER — ASPIRIN/CALCIUM CARB/MAGNESIUM 324 MG
1 TABLET ORAL
Qty: 0 | Refills: 0 | DISCHARGE

## 2023-01-02 RX ORDER — ACETAMINOPHEN 500 MG
975 TABLET ORAL ONCE
Refills: 0 | Status: COMPLETED | OUTPATIENT
Start: 2023-01-02 | End: 2023-01-02

## 2023-01-02 RX ORDER — NITROGLYCERIN 6.5 MG
0.4 CAPSULE, EXTENDED RELEASE ORAL ONCE
Refills: 0 | Status: COMPLETED | OUTPATIENT
Start: 2023-01-02 | End: 2023-01-02

## 2023-01-02 RX ORDER — ASPIRIN/CALCIUM CARB/MAGNESIUM 324 MG
325 TABLET ORAL ONCE
Refills: 0 | Status: COMPLETED | OUTPATIENT
Start: 2023-01-02 | End: 2023-01-02

## 2023-01-02 RX ORDER — ONDANSETRON 8 MG/1
4 TABLET, FILM COATED ORAL ONCE
Refills: 0 | Status: COMPLETED | OUTPATIENT
Start: 2023-01-02 | End: 2023-01-03

## 2023-01-02 RX ORDER — METOPROLOL TARTRATE 50 MG
12.5 TABLET ORAL
Refills: 0 | Status: DISCONTINUED | OUTPATIENT
Start: 2023-01-02 | End: 2023-01-04

## 2023-01-02 RX ADMIN — Medication 325 MILLIGRAM(S): at 13:54

## 2023-01-02 RX ADMIN — Medication 12.5 MILLIGRAM(S): at 18:30

## 2023-01-02 RX ADMIN — Medication 0.4 MILLIGRAM(S): at 06:54

## 2023-01-02 RX ADMIN — Medication 0.4 MILLIGRAM(S): at 20:22

## 2023-01-02 RX ADMIN — MORPHINE SULFATE 4 MILLIGRAM(S): 50 CAPSULE, EXTENDED RELEASE ORAL at 14:11

## 2023-01-02 RX ADMIN — Medication 975 MILLIGRAM(S): at 10:43

## 2023-01-02 RX ADMIN — MORPHINE SULFATE 1 MILLIGRAM(S): 50 CAPSULE, EXTENDED RELEASE ORAL at 22:06

## 2023-01-02 RX ADMIN — Medication 975 MILLIGRAM(S): at 09:52

## 2023-01-02 RX ADMIN — MORPHINE SULFATE 4 MILLIGRAM(S): 50 CAPSULE, EXTENDED RELEASE ORAL at 14:41

## 2023-01-02 NOTE — ED PROVIDER NOTE - NS ED ROS FT
_  CONSTITUTIONAL: no fever, no generalized weakness  HEAD & NECK: no head trauma, no headache, no neck pain, no neck stiffness  EENT: no visual changes, no hearing changes, no nasal discharge, no sore throat  CARDIO: +chest pain, no palpitations  RESP: no cough, no shortness of breath  GI: no abdominal pain, no vomiting, no diarrhea   : no dysuria, no hematuria  MSK: no back pain, no joint pain  NEURO: no LOC, no numbness, no focal weakness   SKIN: no lacerations, no rash  HEME: no hematochezia, no melena

## 2023-01-02 NOTE — H&P ADULT - ASSESSMENT
61 yo M with PMHx of HTN, HLD, former smoker presents to the ED for chest pain that started on Thursday 12/29.    #Atypical chest pain   Present at rest, relieved with nitro, substernal   EKG with no ischemic changes   trop neg x4  - check lipid profile, a1c   - cardiology consult   - keep npo after midnight for now   - start lopressor 12.5 mg BID   - increase atorvastatin to 80 mg for now   - c/w asa 81 mg daily     #HLD   atorvastatin     #HTN   HCTZ       #Misc  - DVT Prophylaxis: lovenox   - GI Prophylaxis: not indicated   - Diet: DASH   - Activity: AAT  - IV Fluids: none   - Code Status: full

## 2023-01-02 NOTE — CONSULT NOTE ADULT - SUBJECTIVE AND OBJECTIVE BOX
HPI:  63 yo M with PMHx of HTN, HLD, former smoker presents to the ED for chest pain that started on . Pt was in his USOH until Thursday when he developed substernal chest pain which was squeezing in nature. Pain is constant 2/10, 10/10 at its worst, present at rest, relieved with pain medications. Pt presented to [facility] on , ekg was non ischemic at the time with trop neg x3. Pt was discharged with outpt cardio f/u. Pt also states feeling lightheaded and palpitations when walking uphill.   In ED, VS within normal limits, trop neg, ekg no ischemic changes, labs within normal limits, CXR shows no acute pathology.   < from: CT Angio Heart and Coronaries w/ IV Cont (23 @ 10:02) >  Focal moderate to severe narrowing within the proximal ramus intermedius branch.  Calcified plaque within the mid LAD resulting and proximal first diagonal branch in mild to moderate narrowing (approximately 50%; limited due to calcium blooming artifact)  CADRADS 3/4A     (2023 15:03)    Patient with typical chest pain   Positive CCTA   No ACS. Denies current chest pain     PREVIOUS CARDIAC WORKUP    Risk Factors:      PAST MEDICAL & SURGICAL HISTORY  HTN (hypertension)    HLD (hyperlipidemia)        FAMILY HISTORY:  FAMILY HISTORY:  FH: CAD (coronary artery disease) (Sibling)        SOCIAL HISTORY:  Social History:  smoked 1 PPD for ten years, quit 3 years ago (2023 15:03)      ALLERGIES:  No Known Allergies      MEDICATIONS:  aspirin enteric coated 81 milliGRAM(s) Oral daily  atorvastatin 80 milliGRAM(s) Oral at bedtime  hydrochlorothiazide 12.5 milliGRAM(s) Oral daily  metoprolol tartrate 12.5 milliGRAM(s) Oral two times a day  nitroglycerin     SubLingual 0.4 milliGRAM(s) SubLingual once  ondansetron Injectable 4 milliGRAM(s) IV Push once    PRN:      HOME MEDICATIONS:  Home Medications:  Aspir 81 oral delayed release tablet: 1 tab(s) orally once a day (2023 15:14)  atorvastatin 20 mg oral tablet: 1 tab(s) orally once a day (at bedtime) (31 Dec 2022 15:12)  hydroCHLOROthiazide 12.5 mg oral capsule: 1 cap(s) orally once a day (31 Dec 2022 15:12)      VITALS:   T(F): 97.6 ( @ 16:16), Max: 98.9 ( @ 13:24)  HR: 69 ( @ 19:21) (53 - 87)  BP: 178/99 ( @ 19:21) (133/75 - 189/109)  BP(mean): --  RR: 19 ( @ 19:21) (18 - 19)  SpO2: 97% ( @ 19:21) (96% - 99%)    I&O's Summary      REVIEW OF SYSTEMS:  CONSTITUTIONAL: No weakness, fevers or chills  HEENT: No visual changes, neck/ear pain  RESPIRATORY: No cough, sob  CARDIOVASCULAR: See HPI  GASTROINTESTINAL: No abdominal pain. No nausea, vomiting, diarrhea   GENITOURINARY: No dysuria, frequency or hematuria  NEUROLOGICAL: No new focal deficits  SKIN: No new rashes    PHYSICAL EXAM:  General: Not in distress.  Non-toxic appearing.   HEENT: EOMI  Cardio: regular, S1, S2, no murmur  Pulm: B/L BS.  No wheezing / crackles / rales  Abdomen: Soft, non-tender, non-distended. Normoactive bowel sounds  Extremities: No edema b/l le  Neuro: A&O x3. No focal deficits    LABS:                        15.3   6.57  )-----------( 194      ( 2023 06:28 )             45.3         140  |  101  |  19  ----------------------------<  111<H>  4.1   |  34<H>  |  0.9    Ca    10.8<H>      2023 06:28  Mg     2.0         TPro  7.2  /  Alb  4.9  /  TBili  0.6  /  DBili  x   /  AST  21  /  ALT  19  /  AlkPhos  52        Troponin T, Serum: <0.01 ng/mL (23 @ 06:28)    CARDIAC MARKERS ( 2023 06:28 )  x     / <0.01 ng/mL / x     / x     / x            Troponin trend:    Serum Pro-Brain Natriuretic Peptide: 52 pg/mL (23 @ 06:28)      COVID-19 PCR: NotDetec (2023 06:28)      IMAGING:  -CXR:  -TTE:  -CCTA:  -STRESS TEST:  -CATHETERIZATION:    EC Lead ECG:   Ventricular Rate 51 BPM    Atrial Rate 51 BPM    P-R Interval 126 ms    QRS Duration 104 ms    Q-T Interval 426 ms    QTC Calculation(Bazett) 392 ms    P Axis 27 degrees    R Axis -6 degrees    T Axis -4 degrees    Diagnosis Line Sinus bradycardia  Minimal voltage criteria for LVH, may be normal variant ( R in aVL )  Nonspecific ST abnormality  Abnormal ECG    Confirmed by Ashley Lorenz MD ([id number]) on 2023 9:16:24 AM ( @ 05:32)      TELEMETRY EVENTS:

## 2023-01-02 NOTE — ED CDU PROVIDER INITIAL DAY NOTE - OBJECTIVE STATEMENT
62-year-old male with history of hypertension, hyperlipidemia, former smoker presents to the ED complaining of intermittent chest pain for 3 weeks.  Patient states had left-sided pressure, sharp that started on Thursday.  Patient states went to [facility] on Saturday 1231 was admitted and DC'd home.  Patient states continues to have chest pain with associated shortness of breath.  No fever, chills, cough, abdominal pain, nausea, vomiting or diarrhea.

## 2023-01-02 NOTE — ED PROVIDER NOTE - OBJECTIVE STATEMENT
Is a 62-year-old male with a history of hypertension hyperlipidemia former smoker coming in with chest pain for 3 weeks present now left-sided described as uncomfortable without radiation and no associated shortness of breath and it is not exertional he was seen on the 31st at [facility] and had tropes negative x3 Patient is a 62-year-old male with a history of hypertension, hyperlipidemia, former smoker, coming in with chest pain for 3 weeks. The chest pain is present on arrival to ED, left-sided without radiation, described as uncomfortable, without associated shortness of breath, not exertional. He was seen at [facility] on 12/31, was admitted and discharged on the same day (trop negative x3) with plans for outpatient cardiology follow-up for stress testing.  No other complaints - no fever/chills, rhinorrhea, sore throat, palpitations, cough, abd pain, NVD, dysuria, hematuria, new joint pain, FND, rash, trauma.  Cardiologist is Dr. Angel.

## 2023-01-02 NOTE — ED ADULT NURSE REASSESSMENT NOTE - NS ED NURSE REASSESS COMMENT FT1
Pt assessed. Pt remains in observation on cont cardiac monitor. Pt c/o of worsening CP, ekg completed, awaiting ccta. Pt bradycardic. Pt safety maintained, CB in reach will cont to monitor.
Pt complain for chest pain md Princess Rodriguez aware
Pt complain for chest pain, states nitro didn't work needs morphine Sunny Chapin aware.
Pt is a/o/4, ambulating with steady gait. Complaining for chest pain states he having chest pain for last couple hours. request for pain meds sent to [facility].
Pt assessed. A&Ox4. Pt remains in obs. Continuos cardiac monitor in place. Will cont to monitor.

## 2023-01-02 NOTE — H&P ADULT - NSHPLABSRESULTS_GEN_ALL_CORE
15.3   6.57  )-----------( 194      ( 02 Jan 2023 06:28 )             45.3       01-02    140  |  101  |  19  ----------------------------<  111<H>  4.1   |  34<H>  |  0.9    Ca    10.8<H>      02 Jan 2023 06:28  Mg     2.0     01-02    TPro  7.2  /  Alb  4.9  /  TBili  0.6  /  DBili  x   /  AST  21  /  ALT  19  /  AlkPhos  52  01-02              CARDIAC MARKERS ( 02 Jan 2023 06:28 )  x     / <0.01 ng/mL / x     / x     / x      CARDIAC MARKERS ( 31 Dec 2022 15:20 )  x     / <0.01 ng/mL / x     / x     / 6.7 ng/mL

## 2023-01-02 NOTE — ED CDU PROVIDER INITIAL DAY NOTE - PROGRESS NOTE DETAILS
Patient received NTG sl x1 in CT. Awaiting CCTA results. CCTA focal moderate to severe narrowing proxiaml ramus intermedius branch. Calcified plaque mid LAD and proximal first diagnol branch in mid to moderate narrowing. CAD-Rads 3/4A. Results discussed with patient. Attempted to contact cardiac fellow- spoke to CCU resident who states fellow in cath lab doing procedure. Attempted to admit to cards tele but is full. Will admit to telemetry. CCTA focal moderate to severe narrowing proximal ramus intermedius branch. Calcified plaque mid LAD and proximal first diagnol branch in mid to moderate narrowing. CAD-Rads 3/4A. Results discussed with patient. Attempted to contact cardiac fellow- spoke to CCU resident who states fellow in cath lab doing procedure. Attempted to admit to cards tele but is full. Will admit to telemetry.

## 2023-01-02 NOTE — ED CDU PROVIDER DISPOSITION NOTE - ATTENDING APP SHARED VISIT CONTRIBUTION OF CARE
patient evaluated for chest pain, found to have abn CCTA. patient admitted for further cardiac management.

## 2023-01-02 NOTE — ED CDU PROVIDER INITIAL DAY NOTE - NSICDXFAMILYHX_GEN_ALL_CORE_FT
FAMILY HISTORY:  Sibling  Still living? Yes, Estimated age: Age Unknown  FH: CAD (coronary artery disease), Age at diagnosis: 51-60

## 2023-01-02 NOTE — CONSULT NOTE ADULT - ASSESSMENT
Impression   # Stable angina   Positive CCTA   No ACS     Recommendations   - Please start high intensity statin   - Start aspirin 81 mg daily   - Check TTE, lipid panel and A1c   - Keep NPO aftermidnight for possible cath tomorrow     This a preliminary plan. Will need to discuss with attending.   Signature: Sarabjit MADDOX, 1st year Cardiology Fellow   Impression   # Unstable angina   Positive CCTA   No ACS   Patient with active pain and new ECG changes     Recommendations   - Please start high intensity statin   - Give aspirin 325 mg then 81 mg daily   - Please give Plavix 300mg now then 74 mg daily   - Start heparin gtt   - Start a nitro gtt and titrate to pain   - Monitor patient in CCU.    - Check TTE, lipid panel and A1c   - Keep NPO aftermidnight for possible cath tomorrow     This a preliminary plan. Will need to discuss with attending.   Signature: Sarabjit MADDOX, 1st year Cardiology Fellow

## 2023-01-02 NOTE — ED CDU PROVIDER INITIAL DAY NOTE - PHYSICAL EXAMINATION
Vital Signs: I have reviewed the initial vital signs.  Constitutional: NAD, well-nourished, appears stated age, no acute distress.  HEENT: Airway patent, moist MM, no erythema/swelling/deformity of oral structures. EOMI, PERRLA.  CV: regular rate, regular rhythm, well-perfused extremities, 2+ b/l DP and radial pulses equal.  Lungs: BCTA, no increased WOB.  ABD: NTND, no guarding or rebound, no pulsatile mass, no hernias.   MSK: Neck supple, nontender, nl ROM, no stepoff. Chest nontender. Back nontender. Ext nontender, nl rom, no deformity.   INTEG: Skin warm, dry, no rash.  NEURO: A&Ox3, normal strength, nl sensation throughout, normal speech.   PSYCH: Calm, cooperative, normal affect and interaction.

## 2023-01-02 NOTE — H&P ADULT - HISTORY OF PRESENT ILLNESS
63 yo M with PMHx of HTN, HLD, former smoker presents to the ED for chest pain that started on Thursday 12/29. Pt was in his USOH until Thursday when he developed substernal chest pain which was squeezing in nature. Pain is constant 2/10, 10/10 at its worst, present at rest, relieved with pain medications. Pt presented to [facility] on 12/31, ekg was non ischemic at the time with trop neg x3. Pt was discharged with outpt cardio f/u. Pt also states feeling lightheaded and palpitations when walking uphill.   In ED, VS within normal limits, trop neg, ekg no ischemic changes, labs within normal limits, CXR shows no acute pathology.   < from: CT Angio Heart and Coronaries w/ IV Cont (01.02.23 @ 10:02) >  Focal moderate to severe narrowing within the proximal ramus intermedius branch.  Calcified plaque within the mid LAD resulting and proximal first diagonal branch in mild to moderate narrowing (approximately 50%; limited due to calcium blooming artifact)  CADRADS 3/4A

## 2023-01-02 NOTE — ED CDU PROVIDER INITIAL DAY NOTE - ATTENDING APP SHARED VISIT CONTRIBUTION OF CARE
Patient evaluated for chest pain that is been occurring since Thursday constant but waxing and waning not occurring with exertion no shortness of breath.  On exam lungs and heart signs are clear.  Abdomen is soft.  Plan will be to obtain CCTA

## 2023-01-02 NOTE — ED PROVIDER NOTE - PHYSICAL EXAMINATION
_  Vital signs reviewed; ABCs intact  GENERAL: Well nourished, comfortable  SKIN: Warm, dry  HEAD & NECK: NCAT, supple neck  EYES: EOMI, PER B/L  ENT: MMM  CARD: RRR, S1, S2; no murmurs, no rubs, no gallops  RESP: Normal respiratory effort, CTAB, no rales, no wheezing  ABD: Soft, ND, NT, no rebound, no guarding  EXT: Pulses palpable distally  NEUROMSK: Grossly intact  PSYCH: AAOx3, cooperative, appropriate

## 2023-01-02 NOTE — ED CDU PROVIDER INITIAL DAY NOTE - NS ED ATTENDING STATEMENT MOD
This was a shared visit with the ANMOL. I reviewed and verified the documentation and independently performed the documented:

## 2023-01-02 NOTE — ED CDU PROVIDER INITIAL DAY NOTE - INPATIENT RECORD SUMMARY
Inpatient records reviewed from admission December 2022 which showed evaluation for chest pain 3 negative troponins

## 2023-01-02 NOTE — ED PROVIDER NOTE - CARE PLAN
Daily Note     Today's date: 2022  Patient name: Cecilia Campbell  : 1957  MRN: [id number]  Referring provider: Nilsa Magaña Principal Discharge DX:	Chest pain   1

## 2023-01-02 NOTE — H&P ADULT - NSHPPHYSICALEXAM_GEN_ALL_CORE
PHYSICAL EXAM:  GENERAL: NAD, well-groomed, well-developed  HEAD:  Atraumatic, Normocephalic  EYES: EOMI, PERRLA, conjunctiva and sclera clear  ENMT: No tonsillar erythema, exudates, or enlargement; Moist mucous membranes  NECK: Supple, No JVD, Normal thyroid  HEART: Regular rate and rhythm; No murmurs, rubs, or gallops  RESPIRATORY: CTA B/L, No W/R/R  ABDOMEN: Soft, Nontender, Nondistended; Bowel sounds present  NEUROLOGY: A&Ox3, nonfocal, moving all extremities  EXTREMITIES:  No clubbing, cyanosis, or edema  SKIN: warm, dry, normal color, no rash or abnormal lesions

## 2023-01-03 LAB
APTT BLD: 34.4 SEC — SIGNIFICANT CHANGE UP (ref 27–39.2)
GLUCOSE BLDC GLUCOMTR-MCNC: 104 MG/DL — HIGH (ref 70–99)
GLUCOSE BLDC GLUCOMTR-MCNC: 99 MG/DL — SIGNIFICANT CHANGE UP (ref 70–99)
INR BLD: 1 RATIO — SIGNIFICANT CHANGE UP (ref 0.65–1.3)
PROTHROM AB SERPL-ACNC: 11.4 SEC — SIGNIFICANT CHANGE UP (ref 9.95–12.87)
TROPONIN T SERPL-MCNC: <0.01 NG/ML — SIGNIFICANT CHANGE UP

## 2023-01-03 PROCEDURE — 93010 ELECTROCARDIOGRAM REPORT: CPT | Mod: 77

## 2023-01-03 PROCEDURE — 93010 ELECTROCARDIOGRAM REPORT: CPT

## 2023-01-03 PROCEDURE — 93458 L HRT ARTERY/VENTRICLE ANGIO: CPT | Mod: 26

## 2023-01-03 RX ORDER — HEPARIN SODIUM 5000 [USP'U]/ML
8000 INJECTION INTRAVENOUS; SUBCUTANEOUS ONCE
Refills: 0 | Status: COMPLETED | OUTPATIENT
Start: 2023-01-03 | End: 2023-01-03

## 2023-01-03 RX ORDER — HEPARIN SODIUM 5000 [USP'U]/ML
4000 INJECTION INTRAVENOUS; SUBCUTANEOUS EVERY 6 HOURS
Refills: 0 | Status: DISCONTINUED | OUTPATIENT
Start: 2023-01-03 | End: 2023-01-03

## 2023-01-03 RX ORDER — ATORVASTATIN CALCIUM 80 MG/1
40 TABLET, FILM COATED ORAL AT BEDTIME
Refills: 0 | Status: DISCONTINUED | OUTPATIENT
Start: 2023-01-03 | End: 2023-01-04

## 2023-01-03 RX ORDER — AMLODIPINE BESYLATE 2.5 MG/1
5 TABLET ORAL DAILY
Refills: 0 | Status: DISCONTINUED | OUTPATIENT
Start: 2023-01-03 | End: 2023-01-04

## 2023-01-03 RX ORDER — PANTOPRAZOLE SODIUM 20 MG/1
40 TABLET, DELAYED RELEASE ORAL
Refills: 0 | Status: DISCONTINUED | OUTPATIENT
Start: 2023-01-03 | End: 2023-01-04

## 2023-01-03 RX ORDER — HEPARIN SODIUM 5000 [USP'U]/ML
INJECTION INTRAVENOUS; SUBCUTANEOUS
Qty: 25000 | Refills: 0 | Status: DISCONTINUED | OUTPATIENT
Start: 2023-01-03 | End: 2023-01-03

## 2023-01-03 RX ORDER — SODIUM CHLORIDE 9 MG/ML
1000 INJECTION INTRAMUSCULAR; INTRAVENOUS; SUBCUTANEOUS
Refills: 0 | Status: DISCONTINUED | OUTPATIENT
Start: 2023-01-03 | End: 2023-01-04

## 2023-01-03 RX ORDER — MORPHINE SULFATE 50 MG/1
1 CAPSULE, EXTENDED RELEASE ORAL ONCE
Refills: 0 | Status: DISCONTINUED | OUTPATIENT
Start: 2023-01-03 | End: 2023-01-03

## 2023-01-03 RX ORDER — HEPARIN SODIUM 5000 [USP'U]/ML
8000 INJECTION INTRAVENOUS; SUBCUTANEOUS EVERY 6 HOURS
Refills: 0 | Status: DISCONTINUED | OUTPATIENT
Start: 2023-01-03 | End: 2023-01-03

## 2023-01-03 RX ORDER — CLOPIDOGREL BISULFATE 75 MG/1
300 TABLET, FILM COATED ORAL ONCE
Refills: 0 | Status: COMPLETED | OUTPATIENT
Start: 2023-01-03 | End: 2023-01-03

## 2023-01-03 RX ORDER — ACETAMINOPHEN 500 MG
650 TABLET ORAL EVERY 6 HOURS
Refills: 0 | Status: DISCONTINUED | OUTPATIENT
Start: 2023-01-03 | End: 2023-01-04

## 2023-01-03 RX ORDER — CALCIUM CARBONATE 500(1250)
1 TABLET ORAL ONCE
Refills: 0 | Status: COMPLETED | OUTPATIENT
Start: 2023-01-03 | End: 2023-01-03

## 2023-01-03 RX ORDER — ISOSORBIDE MONONITRATE 60 MG/1
30 TABLET, EXTENDED RELEASE ORAL DAILY
Refills: 0 | Status: DISCONTINUED | OUTPATIENT
Start: 2023-01-03 | End: 2023-01-04

## 2023-01-03 RX ORDER — CLOPIDOGREL BISULFATE 75 MG/1
75 TABLET, FILM COATED ORAL DAILY
Refills: 0 | Status: DISCONTINUED | OUTPATIENT
Start: 2023-01-03 | End: 2023-01-03

## 2023-01-03 RX ORDER — NITROGLYCERIN 6.5 MG
5 CAPSULE, EXTENDED RELEASE ORAL
Qty: 50 | Refills: 0 | Status: DISCONTINUED | OUTPATIENT
Start: 2023-01-03 | End: 2023-01-04

## 2023-01-03 RX ORDER — HEPARIN SODIUM 5000 [USP'U]/ML
6000 INJECTION INTRAVENOUS; SUBCUTANEOUS EVERY 6 HOURS
Refills: 0 | Status: DISCONTINUED | OUTPATIENT
Start: 2023-01-03 | End: 2023-01-03

## 2023-01-03 RX ORDER — ENOXAPARIN SODIUM 100 MG/ML
40 INJECTION SUBCUTANEOUS EVERY 24 HOURS
Refills: 0 | Status: DISCONTINUED | OUTPATIENT
Start: 2023-01-04 | End: 2023-01-04

## 2023-01-03 RX ADMIN — ATORVASTATIN CALCIUM 40 MILLIGRAM(S): 80 TABLET, FILM COATED ORAL at 21:15

## 2023-01-03 RX ADMIN — PANTOPRAZOLE SODIUM 40 MILLIGRAM(S): 20 TABLET, DELAYED RELEASE ORAL at 15:30

## 2023-01-03 RX ADMIN — Medication 650 MILLIGRAM(S): at 22:09

## 2023-01-03 RX ADMIN — ENOXAPARIN SODIUM 40 MILLIGRAM(S): 100 INJECTION SUBCUTANEOUS at 23:53

## 2023-01-03 RX ADMIN — HEPARIN SODIUM 1000 UNIT(S)/HR: 5000 INJECTION INTRAVENOUS; SUBCUTANEOUS at 06:49

## 2023-01-03 RX ADMIN — Medication 12.5 MILLIGRAM(S): at 06:51

## 2023-01-03 RX ADMIN — SODIUM CHLORIDE 100 MILLILITER(S): 9 INJECTION INTRAMUSCULAR; INTRAVENOUS; SUBCUTANEOUS at 10:24

## 2023-01-03 RX ADMIN — ONDANSETRON 4 MILLIGRAM(S): 8 TABLET, FILM COATED ORAL at 12:49

## 2023-01-03 RX ADMIN — Medication 12.5 MILLIGRAM(S): at 18:37

## 2023-01-03 RX ADMIN — CLOPIDOGREL BISULFATE 75 MILLIGRAM(S): 75 TABLET, FILM COATED ORAL at 08:54

## 2023-01-03 RX ADMIN — ISOSORBIDE MONONITRATE 30 MILLIGRAM(S): 60 TABLET, EXTENDED RELEASE ORAL at 17:12

## 2023-01-03 RX ADMIN — HEPARIN SODIUM 1800 UNIT(S)/HR: 5000 INJECTION INTRAVENOUS; SUBCUTANEOUS at 02:56

## 2023-01-03 RX ADMIN — Medication 81 MILLIGRAM(S): at 08:54

## 2023-01-03 RX ADMIN — ATORVASTATIN CALCIUM 80 MILLIGRAM(S): 80 TABLET, FILM COATED ORAL at 02:14

## 2023-01-03 RX ADMIN — AMLODIPINE BESYLATE 5 MILLIGRAM(S): 2.5 TABLET ORAL at 12:49

## 2023-01-03 RX ADMIN — CLOPIDOGREL BISULFATE 300 MILLIGRAM(S): 75 TABLET, FILM COATED ORAL at 02:09

## 2023-01-03 RX ADMIN — MORPHINE SULFATE 1 MILLIGRAM(S): 50 CAPSULE, EXTENDED RELEASE ORAL at 02:00

## 2023-01-03 RX ADMIN — Medication 1 TABLET(S): at 15:30

## 2023-01-03 RX ADMIN — Medication 1.5 MICROGRAM(S)/MIN: at 02:17

## 2023-01-03 RX ADMIN — HEPARIN SODIUM 8000 UNIT(S): 5000 INJECTION INTRAVENOUS; SUBCUTANEOUS at 02:58

## 2023-01-03 NOTE — PATIENT PROFILE ADULT - FUNCTIONAL ASSESSMENT - DAILY ACTIVITY 1.
4 = No assist / stand by assistance Manual Repair Warning Statement: We plan on removing the manually selected variable below in favor of our much easier automatic structured text blocks found in the previous tab. We decided to do this to help make the flow better and give you the full power of structured data. Manual selection is never going to be ideal in our platform and I would encourage you to avoid using manual selection from this point on, especially since I will be sunsetting this feature. It is important that you do one of two things with the customized text below. First, you can save all of the text in a word file so you can have it for future reference. Second, transfer the text to the appropriate area in the Library tab. Lastly, if there is a flap or graft type which we do not have you need to let us know right away so I can add it in before the variable is hidden. No need to panic, we plan to give you roughly 6 months to make the change.

## 2023-01-03 NOTE — PROGRESS NOTE ADULT - ASSESSMENT
61 y/o M w/ PMHx of HTN, HLD, former smoker presents to the ED for chest pain that started on Thursday 12/29.    #Atypical chest pain  #Unstable Angina   - EKG on admission w/ no ischemic changes   - Repeat EKG on 1/3/22 showing ST depressions in III & aVF, sinus bradycardia  -CATH REPORT : Mild nonobstructive disease  - stop heparin gtt  - Stop nitro gtt for CP  - Stop Plavix  - C/w aspirin 81 mg  - lower atorvastatin to 40 mg qhs  - C/w metoprolol tartrate 12.5 mg BID  - start amlodipine 5  - IMDUR? reassess pain  - F/u lipid profile, A1c    #HLD   - C/w atorvastatin 40 mg qhs    #HTN   - C/w home HCTZ     #MISC  - DVT Prophylaxis: Lovenox   - GI Prophylaxis: NI  - Diet: DASH   - Activity: AAT  - Full Code  - Dispo: CCU

## 2023-01-03 NOTE — CHART NOTE - NSCHARTNOTEFT_GEN_A_CORE
PRE-OP DIAGNOSIS:    Unstable Angina  (+) CCTA on 1/2/23    PROCEDURE:   [x] Coronary Angiogram   [x] LHC   [] RHC   [] Intervention (see below)        PHYSICIAN: Dr. Dobbins   CARDIOLOGY FELLOW: Dr. Kim      PROCEDURE DESCRIPTION:     Consent:  [x] Patient   [] Family Member   []  Used     Anesthesia:   [] General   [x] Sedation   [x] Local     Access & Closure:   [x] 6Fr right Radial Artery   [] 6Fr right Femoral Artery   [] 6Fr right Femoral Vein   [] 6Fr right Brachial Vein       IV Contrast:      40  mL        Intervention: none    Implants: none     FINDINGS:     Coronary Dominance: Right    LM: Minor disease  LAD: Mild disease  D1: Mild disease  D2: Ostial 50% stenosis  LCx: Mild disease  OM1: Mild disease   Ramus: Mild disease  RCA: Mild disease, mild proximal ectasia  rPDA: Mild disease    LVEDP:   <5 mmhg     EF: 60%     ESTIMATED BLOOD LOSS: < 10 mL      CONDITION:   [x] Good   [] Fair   [] Critical     SPECIMEN REMOVED: N/A     POST-OP DIAGNOSIS:    [] Normal Coronary Angiogram   [] Minor luminal irregularities  [x] Mild Non-obstructive Coronary Artery Disease (< 50% stenosis)   [] Significant -Vessel Coronary Artery Disease     PLAN OF CARE:   [] D/C Home Today   [x] Return to In-patient bed   [] Admit for observation   [] Return for Staged Procedure   [] CT Surgery Consult   [x] Medications & Plan: Can stop therapeutic heparin and plavix, continue aspirin and statin, risk factors modifications and control BP  [x] IV Fluids: 100cc/hr for 8 hours PRE-OP DIAGNOSIS:    Unstable Angina  (+) CCTA on 1/2/23    PROCEDURE:   [x] Coronary Angiogram   [x] LHC   [] RHC   [] Intervention (see below)        PHYSICIAN: Dr. Dobbins   CARDIOLOGY FELLOW: Dr. Kim      PROCEDURE DESCRIPTION:     Consent:  [x] Patient   [] Family Member   []  Used     Anesthesia:   [] General   [x] Sedation   [x] Local     Access & Closure:   [x] 6Fr right Radial Artery   [] 6Fr right Femoral Artery   [] 6Fr right Femoral Vein   [] 6Fr right Brachial Vein       IV Contrast:      40  mL        Intervention: none    Implants: none     FINDINGS:     Coronary Dominance: Right    LM: Minor disease  LAD: Mild disease  D1: Mild disease  D2: Ostial 40% stenosis  LCx: Mild disease  OM1: Mild disease   Ramus: Mild disease  RCA: Mild disease, mild proximal ectasia  rPDA: Mild disease    LVEDP:   <5 mmhg     EF: 60%     ESTIMATED BLOOD LOSS: < 10 mL      CONDITION:   [x] Good   [] Fair   [] Critical     SPECIMEN REMOVED: N/A     POST-OP DIAGNOSIS:    [] Normal Coronary Angiogram   [] Minor luminal irregularities  [x] Mild Non-obstructive Coronary Artery Disease (< 50% stenosis)   [] Significant -Vessel Coronary Artery Disease     PLAN OF CARE:   [] D/C Home Today   [x] Return to In-patient bed   [] Admit for observation   [] Return for Staged Procedure   [] CT Surgery Consult   [x] Medications & Plan: Can stop therapeutic heparin and plavix, continue aspirin and statin, risk factors modifications and control BP  [x] IV Fluids: 100cc/hr for 8 hours

## 2023-01-03 NOTE — PROGRESS NOTE ADULT - SUBJECTIVE AND OBJECTIVE BOX
SUBJECTIVE:  Patient is a 62y old Male who presents with a chief complaint of  Chest pain     Today is hospital day 1d. Upgraded to ccu precath overnight.    HPI  HPI:  61 yo M with PMHx of HTN, HLD, former smoker presents to the ED for chest pain that started on Thursday 12/29. Pt was in his USOH until Thursday when he developed substernal chest pain which was squeezing in nature. Pain is constant 2/10, 10/10 at its worst, present at rest, relieved with pain medications. Pt presented to [facility] on 12/31, ekg was non ischemic at the time with trop neg x3. Pt was discharged with outpt cardio f/u. Pt also states feeling lightheaded and palpitations when walking uphill.   In ED, VS within normal limits, trop neg, ekg no ischemic changes, labs within normal limits, CXR shows no acute pathology.   < from: CT Angio Heart and Coronaries w/ IV Cont (01.02.23 @ 10:02) >  Focal moderate to severe narrowing within the proximal ramus intermedius branch.  Calcified plaque within the mid LAD resulting and proximal first diagonal branch in mild to moderate narrowing (approximately 50%; limited due to calcium blooming artifact)  CADRADS 3/4A     (02 Jan 2023 15:03)      PAST MEDICAL & SURGICAL HISTORY  HTN (hypertension)    HLD (hyperlipidemia)      ALLERGIES:  No Known Allergies    MEDICATIONS:  HOME MEDICATIONS  Aspir 81 oral delayed release tablet: 1 tab(s) orally once a day  atorvastatin 20 mg oral tablet: 1 tab(s) orally once a day (at bedtime)  hydroCHLOROthiazide 12.5 mg oral capsule: 1 cap(s) orally once a day    STANDING MEDICATIONS  amLODIPine   Tablet 5 milliGRAM(s) Oral daily  aspirin enteric coated 81 milliGRAM(s) Oral daily  atorvastatin 40 milliGRAM(s) Oral at bedtime  hydrochlorothiazide 12.5 milliGRAM(s) Oral daily  metoprolol tartrate 12.5 milliGRAM(s) Oral two times a day  nitroglycerin  Infusion 5 MICROgram(s)/Min IV Continuous <Continuous>  ondansetron Injectable 4 milliGRAM(s) IV Push once  sodium chloride 0.9%. 1000 milliLiter(s) IV Continuous <Continuous>    PRN MEDICATIONS  morphine  - Injectable 1 milliGRAM(s) IV Push every 6 hours PRN    VITALS:   T(C): 36.7 (01-03-23 @ 07:00), Max: 36.7 (01-03-23 @ 07:00)  T(F): 98 (01-03-23 @ 07:00), Max: 98 (01-03-23 @ 07:00)  HR: 64 (01-03-23 @ 07:00) (54 - 74)  BP: 176/95 (01-03-23 @ 07:00) (133/75 - 181/84)  BP(mean): 128 (01-03-23 @ 07:00) (128 - 128)  ABP: --  ABP(mean): --  RR: 20 (01-03-23 @ 07:00) (16 - 20)  SpO2: 97% (01-03-23 @ 07:00) (97% - 98%)  LABS:                        15.3   6.57  )-----------( 194      ( 02 Jan 2023 06:28 )             45.3     01-02    140  |  101  |  19  ----------------------------<  111<H>  4.1   |  34<H>  |  0.9    Ca    10.8<H>      02 Jan 2023 06:28  Mg     2.0     01-02    TPro  7.2  /  Alb  4.9  /  TBili  0.6  /  DBili  x   /  AST  21  /  ALT  19  /  AlkPhos  52  01-02    PT/INR - ( 03 Jan 2023 02:40 )   PT: 11.40 sec;   INR: 1.00 ratio         PTT - ( 03 Jan 2023 02:40 )  PTT:34.4 sec    I&O's Detail    02 Jan 2023 07:01  -  03 Jan 2023 07:00  --------------------------------------------------------  IN:    IV PiggyBack: 45 mL    IV PiggyBack: 54 mL    Oral Fluid: 350 mL  Total IN: 449 mL    OUT:  Total OUT: 0 mL    Total NET: 449 mL          Troponin T, Serum: <0.01 ng/mL (01-03-23 @ 02:40)        CARDIAC MARKERS ( 03 Jan 2023 02:40 )  x     / <0.01 ng/mL / x     / x     / x      CARDIAC MARKERS ( 02 Jan 2023 06:28 )  x     / <0.01 ng/mL / x     / x     / x          RADIOLOGY:  EKG  12 Lead ECG:   Ventricular Rate 59 BPM    Atrial Rate 59 BPM    P-R Interval 170 ms    QRS Duration 102 ms    Q-T Interval 426 ms    QTC Calculation(Bazett) 421 ms    P Axis 9 degrees    R Axis -17 degrees    T Axis -36 degrees    Diagnosis Line Sinus bradycardia  Voltage criteria for left ventricular hypertrophy  ST & T wave abnormality, consider inferior ischemia  Abnormal ECG    Confirmed by Ashley Lorenz MD ([id number]) on 1/3/2023 7:40:35 AM (01-03-23 @ 02:29)  12 Lead ECG:   Ventricular Rate 54 BPM    Atrial Rate 54 BPM    P-R Interval 176 ms    QRS Duration 96 ms    Q-T Interval 440 ms    QTC Calculation(Bazett) 417 ms    P Axis 32 degrees    R Axis -16 degrees    T Axis -5 degrees    Diagnosis Line Sinus bradycardia  Minimal voltage criteria for LVH, may be normal variant  Borderline ECG    Confirmed by Ashley Lorenz MD ([id number]) on 1/3/2023 7:53:16 AM (01-02-23 @ 20:25)      Physical Exam:  General: no acute distress, lying in bed  Head: normocephalic and atraumatic  Neck: supple  Heart: regular rate and rhythm, S1 and S2 normal, no murmurs, rubs or gallops noted on exam  Lungs: Symmetric chest expansion bilaterally, clear lungs bilaterally, no wheezes rhonchi or crackles heard  Abdomen: Bowel sounds present, non tender on light and deep palpation  Extremities: No edema, no clubbing or cyanosis notes, positive peripheral pulses

## 2023-01-03 NOTE — CHART NOTE - NSCHARTNOTEFT_GEN_A_CORE
Transfer Note  ---------------------------    Transfer from: Medicine  Transfer to: CCU  Accepting Physician: Dr. Hannon      HOSPITAL COURSE      63 yo M with PMHx of HTN, HLD, former smoker presents to the ED for chest pain that started on Thursday 12/29. Pt was in his USOH until Thursday when he developed substernal chest pain which was squeezing in nature. Pain is constant 2/10, 10/10 at its worst, present at rest, relieved with pain medications. Pt presented to [facility] on 12/[facility], ekg was non ischemic at the time with trop neg x3. Pt was discharged with outpt cardio f/u. Pt also states feeling lightheaded and palpitations when walking uphill.   In ED, VS within normal limits, trop neg, ekg no ischemic changes, labs within normal limits, CXR shows no acute pathology.   < from: CT Angio Heart and Coronaries w/ IV Cont (01.02.23 @ 10:02) >  Focal moderate to severe narrowing within the proximal ramus intermedius branch.  Calcified plaque within the mid LAD resulting and proximal first diagonal branch in mild to moderate narrowing (approximately 50%; limited due to calcium blooming artifact)  CADRADS 3/4A    On 1/2/22, patient continued to c/o CP, minimally relieved w/ morphine & sublingual nitro. Repeat EKG showed ST depressions in III & aVF. Discussed w/ cardio fellow & pt upgraded to CCU for further management. Loaded w/ plavix 300 mg. Started on nitro gtt, heparin gtt.    To-Do:    [x] NPO @ mn for cardiac cath on 1/3/22    OBJECTIVE --  Vital Signs Last 24 Hrs  T(C): 35.9 (03 Jan 2023 03:44), Max: 36.4 (02 Jan 2023 16:16)  T(F): 96.6 (03 Jan 2023 03:44), Max: 97.6 (02 Jan 2023 16:16)  HR: 56 (03 Jan 2023 04:10) (53 - 74)  BP: 150/84 (03 Jan 2023 04:10) (133/75 - 181/84)  BP(mean): --  RR: 18 (03 Jan 2023 04:10) (18 - 20)  SpO2: 97% (03 Jan 2023 04:10) (97% - 98%)    Parameters below as of 03 Jan 2023 04:10  Patient On (Oxygen Delivery Method): room air        I&O's Summary      MEDICATIONS  (STANDING):  aspirin enteric coated 81 milliGRAM(s) Oral daily  atorvastatin 80 milliGRAM(s) Oral at bedtime  clopidogrel Tablet 75 milliGRAM(s) Oral daily  heparin  Infusion.  Unit(s)/Hr (18 mL/Hr) IV Continuous <Continuous>  hydrochlorothiazide 12.5 milliGRAM(s) Oral daily  metoprolol tartrate 12.5 milliGRAM(s) Oral two times a day  nitroglycerin  Infusion 5 MICROgram(s)/Min (1.5 mL/Hr) IV Continuous <Continuous>  ondansetron Injectable 4 milliGRAM(s) IV Push once    MEDICATIONS  (PRN):  heparin   Injectable 8000 Unit(s) IV Push every 6 hours PRN For aPTT less than 40  heparin   Injectable 4000 Unit(s) IV Push every 6 hours PRN For aPTT between 40 - 57  morphine  - Injectable 1 milliGRAM(s) IV Push every 6 hours PRN Moderate Pain (4 - 6)        LABS                                            15.3                  Neurophils% (auto):   61.6   (01-02 @ 06:28):    6.57 )-----------(194          Lymphocytes% (auto):  26.6                                          45.3                   Eosinphils% (auto):   3.3      Manual%: Neutrophils x    ; Lymphocytes x    ; Eosinophils x    ; Bands%: x    ; Blasts x                                    140    |  101    |  19                  Calcium: 10.8  / iCa: x      (01-02 @ 06:28)    ----------------------------<  111       Magnesium: 2.0                              4.1     |  34     |  0.9              Phosphorous: x        TPro  7.2    /  Alb  4.9    /  TBili  0.6    /  DBili  x      /  AST  21     /  ALT  19     /  AlkPhos  52     02 Jan 2023 06:28    ( 01-03 @ 02:40 )   PT: 11.40 sec;   INR: 1.00 ratio  aPTT: 34.4 sec          ASSESSMENT & PLAN:     63 y/o M w/ PMHx of HTN, HLD, former smoker presents to the ED for chest pain that started on Thursday 12/29.    #Atypical chest pain  #Unstable Angina   - EKG on admission w/ no ischemic changes   - Repeat EKG on 1/3/22 showing ST depressions in III & aVF, sinus bradycardia  - NPO @ [facility]n for possible cardiac cath on 1/3/22  - Started on heparin gtt  - Started on nitro gtt for CP  - Loaded w/ plavix 300 mg  - C/w aspirin 81 mg  - C/w plavix 75 mg  - C/w atorvastatin 80 mg qhs  - C/w metoprolol tartrate 12.5 mg BID  - Consider ACE/ARB after cath  - F/u lipid profile, A1c    #HLD   - C/w atorvastatin 80 mg qhs    #HTN   - C/w home HCTZ     #MISC  - DVT Prophylaxis: Lovenox   - GI Prophylaxis: NI  - Diet: DASH   - Activity: AAT  - Full Code  - Dispo: CCU

## 2023-01-04 ENCOUNTER — TRANSCRIPTION ENCOUNTER (OUTPATIENT)
Age: 63
End: 2023-01-04

## 2023-01-04 VITALS
RESPIRATION RATE: 17 BRPM | OXYGEN SATURATION: 94 % | DIASTOLIC BLOOD PRESSURE: 68 MMHG | HEART RATE: 55 BPM | SYSTOLIC BLOOD PRESSURE: 116 MMHG

## 2023-01-04 LAB
A1C WITH ESTIMATED AVERAGE GLUCOSE RESULT: 5.5 % — SIGNIFICANT CHANGE UP (ref 4–5.6)
CHOLEST SERPL-MCNC: 162 MG/DL — SIGNIFICANT CHANGE UP
ESTIMATED AVERAGE GLUCOSE: 111 MG/DL — SIGNIFICANT CHANGE UP (ref 68–114)
HCT VFR BLD CALC: 41.4 % — LOW (ref 42–52)
HDLC SERPL-MCNC: 50 MG/DL — SIGNIFICANT CHANGE UP
HGB BLD-MCNC: 14.3 G/DL — SIGNIFICANT CHANGE UP (ref 14–18)
LIPID PNL WITH DIRECT LDL SERPL: 93 MG/DL — SIGNIFICANT CHANGE UP
MCHC RBC-ENTMCNC: 28.9 PG — SIGNIFICANT CHANGE UP (ref 27–31)
MCHC RBC-ENTMCNC: 34.5 G/DL — SIGNIFICANT CHANGE UP (ref 32–37)
MCV RBC AUTO: 83.6 FL — SIGNIFICANT CHANGE UP (ref 80–94)
NON HDL CHOLESTEROL: 112 MG/DL — SIGNIFICANT CHANGE UP
NRBC # BLD: 0 /100 WBCS — SIGNIFICANT CHANGE UP (ref 0–0)
PLATELET # BLD AUTO: 191 K/UL — SIGNIFICANT CHANGE UP (ref 130–400)
RBC # BLD: 4.95 M/UL — SIGNIFICANT CHANGE UP (ref 4.7–6.1)
RBC # FLD: 13 % — SIGNIFICANT CHANGE UP (ref 11.5–14.5)
TRIGL SERPL-MCNC: 100 MG/DL — SIGNIFICANT CHANGE UP
TROPONIN T SERPL-MCNC: <0.01 NG/ML — SIGNIFICANT CHANGE UP
WBC # BLD: 9.34 K/UL — SIGNIFICANT CHANGE UP (ref 4.8–10.8)
WBC # FLD AUTO: 9.34 K/UL — SIGNIFICANT CHANGE UP (ref 4.8–10.8)

## 2023-01-04 PROCEDURE — 93010 ELECTROCARDIOGRAM REPORT: CPT

## 2023-01-04 PROCEDURE — 93306 TTE W/DOPPLER COMPLETE: CPT | Mod: 26

## 2023-01-04 RX ORDER — TRAMADOL HYDROCHLORIDE 50 MG/1
50 TABLET ORAL ONCE
Refills: 0 | Status: DISCONTINUED | OUTPATIENT
Start: 2023-01-04 | End: 2023-01-04

## 2023-01-04 RX ORDER — AMLODIPINE BESYLATE 2.5 MG/1
1 TABLET ORAL
Qty: 30 | Refills: 0
Start: 2023-01-04 | End: 2023-02-02

## 2023-01-04 RX ORDER — METOPROLOL TARTRATE 50 MG
1 TABLET ORAL
Qty: 30 | Refills: 0
Start: 2023-01-04 | End: 2023-02-02

## 2023-01-04 RX ORDER — AMLODIPINE BESYLATE 2.5 MG/1
5 TABLET ORAL ONCE
Refills: 0 | Status: COMPLETED | OUTPATIENT
Start: 2023-01-04 | End: 2023-01-04

## 2023-01-04 RX ORDER — METOPROLOL TARTRATE 50 MG
25 TABLET ORAL DAILY
Refills: 0 | Status: DISCONTINUED | OUTPATIENT
Start: 2023-01-05 | End: 2023-01-04

## 2023-01-04 RX ORDER — ATORVASTATIN CALCIUM 80 MG/1
1 TABLET, FILM COATED ORAL
Qty: 30 | Refills: 0
Start: 2023-01-04 | End: 2023-02-02

## 2023-01-04 RX ORDER — PANTOPRAZOLE SODIUM 20 MG/1
1 TABLET, DELAYED RELEASE ORAL
Qty: 30 | Refills: 0
Start: 2023-01-04 | End: 2023-02-02

## 2023-01-04 RX ORDER — METOPROLOL TARTRATE 50 MG
25 TABLET ORAL DAILY
Refills: 0 | Status: DISCONTINUED | OUTPATIENT
Start: 2023-01-04 | End: 2023-01-04

## 2023-01-04 RX ORDER — AMLODIPINE BESYLATE 2.5 MG/1
10 TABLET ORAL DAILY
Refills: 0 | Status: DISCONTINUED | OUTPATIENT
Start: 2023-01-05 | End: 2023-01-04

## 2023-01-04 RX ORDER — METOPROLOL TARTRATE 50 MG
12.5 TABLET ORAL ONCE
Refills: 0 | Status: COMPLETED | OUTPATIENT
Start: 2023-01-04 | End: 2023-01-04

## 2023-01-04 RX ORDER — CHLORHEXIDINE GLUCONATE 213 G/1000ML
1 SOLUTION TOPICAL DAILY
Refills: 0 | Status: DISCONTINUED | OUTPATIENT
Start: 2023-01-04 | End: 2023-01-04

## 2023-01-04 RX ORDER — ISOSORBIDE MONONITRATE 60 MG/1
1 TABLET, EXTENDED RELEASE ORAL
Qty: 30 | Refills: 0
Start: 2023-01-04 | End: 2023-02-02

## 2023-01-04 RX ADMIN — MORPHINE SULFATE 1 MILLIGRAM(S): 50 CAPSULE, EXTENDED RELEASE ORAL at 03:44

## 2023-01-04 RX ADMIN — CHLORHEXIDINE GLUCONATE 1 APPLICATION(S): 213 SOLUTION TOPICAL at 11:27

## 2023-01-04 RX ADMIN — AMLODIPINE BESYLATE 5 MILLIGRAM(S): 2.5 TABLET ORAL at 11:35

## 2023-01-04 RX ADMIN — TRAMADOL HYDROCHLORIDE 50 MILLIGRAM(S): 50 TABLET ORAL at 01:30

## 2023-01-04 RX ADMIN — Medication 12.5 MILLIGRAM(S): at 11:35

## 2023-01-04 RX ADMIN — Medication 650 MILLIGRAM(S): at 05:06

## 2023-01-04 RX ADMIN — AMLODIPINE BESYLATE 5 MILLIGRAM(S): 2.5 TABLET ORAL at 05:05

## 2023-01-04 RX ADMIN — Medication 650 MILLIGRAM(S): at 05:42

## 2023-01-04 RX ADMIN — Medication 81 MILLIGRAM(S): at 11:27

## 2023-01-04 RX ADMIN — Medication 650 MILLIGRAM(S): at 10:58

## 2023-01-04 RX ADMIN — Medication 650 MILLIGRAM(S): at 11:32

## 2023-01-04 RX ADMIN — MORPHINE SULFATE 1 MILLIGRAM(S): 50 CAPSULE, EXTENDED RELEASE ORAL at 02:36

## 2023-01-04 RX ADMIN — PANTOPRAZOLE SODIUM 40 MILLIGRAM(S): 20 TABLET, DELAYED RELEASE ORAL at 05:06

## 2023-01-04 RX ADMIN — TRAMADOL HYDROCHLORIDE 50 MILLIGRAM(S): 50 TABLET ORAL at 00:39

## 2023-01-04 NOTE — DISCHARGE NOTE PROVIDER - HOSPITAL COURSE
63 yo M with PMHx of HTN, HLD, former smoker presents to the ED for chest pain that started on Thursday 12/29. Pt was in his USOH until Thursday when he developed substernal chest pain which was squeezing in nature. Pain is constant 2/10, 10/10 at its worst, present at rest, relieved with pain medications. Pt presented to [facility] on 12/31, ekg was non ischemic at the time with trop neg x3. Pt was discharged with outpt cardio f/u. Pt also states feeling lightheaded and palpitations when walking uphill.   In ED, VS within normal limits, trop neg, ekg no ischemic changes, labs within normal limits, CXR shows no acute pathology.   < from: CT Angio Heart and Coronaries w/ IV Cont (01.02.23 @ 10:02) >  Focal moderate to severe narrowing within the proximal ramus intermedius branch.  Calcified plaque within the mid LAD resulting and proximal first diagonal branch in mild to moderate narrowing (approximately 50%; limited due to calcium blooming artifact)  CADRADS 3/4A  On 1/2/22, patient continued to c/o CP, minimally relieved w/ morphine & sublingual nitro. Repeat EKG showed ST depressions in III & aVF. Discussed w/ cardio fellow & pt upgraded to CCU for further management. Loaded w/ plavix 300 mg. Started on nitro gtt, heparin gtt.    Cath was not significant for obstructive disease, <50% occlusion throughout. Nitro and plavix stopped. Patient says pain is recurring but also describes that it's similar to the feeling he gets with acid reflux, although it recurred after stopping nitro (and morphine). Started BB, amlodipine for BP, IMDUR, and PPI. Clinically stable for dc home with Cardiology followup and outpatient GI referral.

## 2023-01-04 NOTE — DISCHARGE NOTE PROVIDER - NSDCMRMEDTOKEN_GEN_ALL_CORE_FT
amLODIPine 5 mg oral tablet: 1 tab(s) orally once a day  Aspir 81 oral delayed release tablet: 1 tab(s) orally once a day  atorvastatin 40 mg oral tablet: 1 tab(s) orally once a day (at bedtime)  hydroCHLOROthiazide 12.5 mg oral capsule: 1 cap(s) orally once a day  metoprolol succinate 25 mg oral tablet, extended release: 1 tab(s) orally once a day   pantoprazole 40 mg oral delayed release tablet: 1 tab(s) orally once a day (before a meal)   amLODIPine 10 mg oral tablet: 1 tab(s) orally once a day  Aspir 81 oral delayed release tablet: 1 tab(s) orally once a day  atorvastatin 40 mg oral tablet: 1 tab(s) orally once a day (at bedtime)  hydroCHLOROthiazide 12.5 mg oral capsule: 1 cap(s) orally once a day  metoprolol succinate 25 mg oral tablet, extended release: 1 tab(s) orally once a day   pantoprazole 40 mg oral delayed release tablet: 1 tab(s) orally once a day (before a meal)

## 2023-01-04 NOTE — DISCHARGE NOTE PROVIDER - CARE PROVIDERS DIRECT ADDRESSES
,DirectAddress_Unknown,[email or web address]

## 2023-01-04 NOTE — DISCHARGE NOTE PROVIDER - CARE PROVIDER_API CALL
Elvia Campos)  Gastroenterology; Internal Medicine  [address] [place] [address]  Phone: [phone]  Fax: [phone]  Follow Up Time: 2 weeks    Darell Lakhani)  Geriatric Medicine; Internal Medicine  [address] [place] [address]  Phone: [phone]  Fax: [phone]  Follow Up Time: 2 weeks   Darell Lakhani)  Geriatric Medicine; Internal Medicine  [address] [place] [address]  Phone: [phone]  Fax: [phone]  Follow Up Time: 2 weeks

## 2023-01-04 NOTE — DISCHARGE NOTE PROVIDER - NSFOLLOWUPCLINICS_GEN_ALL_ED_FT
[facility] [place]  Cardiology  [address], [address]  [place] [address]  Phone: [phone]  Fax: [phone]  Established Patient

## 2023-01-04 NOTE — DISCHARGE NOTE PROVIDER - PROVIDER TOKENS
PROVIDER:[TOKEN:[[id number]],FOLLOWUP:[2 weeks]],PROVIDER:[TOKEN:[[id number]],FOLLOWUP:[2 weeks]] PROVIDER:[TOKEN:[[id number]],FOLLOWUP:[2 weeks]]

## 2023-01-04 NOTE — DISCHARGE NOTE PROVIDER - NSDCCPCAREPLAN_GEN_ALL_CORE_FT
PRINCIPAL DISCHARGE DIAGNOSIS  Diagnosis: Chest pain  Assessment and Plan of Treatment: Chest Pain  Chest pain can be caused by many different conditions which may or may not be dangerous. Causes include heartburn, lung infections, heart attack, blood clot in lungs, skin infections, strain or damage to muscle, cartilage, or bones, etc. In addition to a history and physical examination, an electrocardiogram (ECG) or other lab tests may have been performed to determine the cause of your chest pain. Follow up with your primary care provider or with a cardiologist as instructed.   SEEK IMMEDIATE MEDICAL CARE IF YOU HAVE ANY OF THE FOLLOWING SYMPTOMS: worsening chest pain, coughing up blood, unexplained back/neck/jaw pain, severe abdominal pain, dizziness or lightheadedness, fainting, shortness of breath, sweaty or clammy skin, vomiting, or racing heart beat. These symptoms may represent a serious problem that is an emergency. Do not wait to see if the symptoms will go away. Get medical help right away. Call 911 and do not drive yourself to the hospital.

## 2023-01-05 DIAGNOSIS — R07.9 CHEST PAIN, UNSPECIFIED: ICD-10-CM

## 2023-01-05 DIAGNOSIS — R07.89 OTHER CHEST PAIN: ICD-10-CM

## 2023-01-05 DIAGNOSIS — E78.5 HYPERLIPIDEMIA, UNSPECIFIED: ICD-10-CM

## 2023-01-05 DIAGNOSIS — I10 ESSENTIAL (PRIMARY) HYPERTENSION: ICD-10-CM

## 2023-01-09 ENCOUNTER — APPOINTMENT (OUTPATIENT)
Dept: CARDIOLOGY | Facility: CLINIC | Age: 63
End: 2023-01-09
Payer: COMMERCIAL

## 2023-01-09 VITALS
BODY MASS INDEX: 42.8 KG/M2 | SYSTOLIC BLOOD PRESSURE: 140 MMHG | DIASTOLIC BLOOD PRESSURE: 82 MMHG | OXYGEN SATURATION: 98 % | WEIGHT: 218 LBS | HEART RATE: 61 BPM | HEIGHT: 60 IN | TEMPERATURE: 98 F

## 2023-01-09 PROCEDURE — 99214 OFFICE O/P EST MOD 30 MIN: CPT | Mod: 25

## 2023-01-09 PROCEDURE — 93000 ELECTROCARDIOGRAM COMPLETE: CPT

## 2023-01-09 NOTE — ASSESSMENT
[FreeTextEntry1] : Mr. Zazueta is a 62-year-old man with history of non-obstructive CAD, hypertension, hyperlipidemia, and BPH presenting for follow up.\par \par Impression:\par (1) Non-obstructive CAD by [facility] 1/2023. Chest pain is unlikely cardiac in etiology.\par (2) HTN\par (3) HLD, LDL 70s, goal <70\par \par Plan:\par - Discussed results of all imaging studies and labs\par - No further cardiac testing or intervention is indicated prior to GI evaluation with endoscopy.\par - Continue aspirin and statin\par - Continue beta blocker\par - Continue HCTZ and amlodipine for HTN\par - Consider MRI of the chest wall to evaluate for muscular/ligamentous disease.\par - Agree with pursuing EGD to evaluate for hiatal hernia, gastritis, PUD, etc.\par \par RTC 6-12 months or sooner as needed

## 2023-01-09 NOTE — HISTORY OF PRESENT ILLNESS
[FreeTextEntry1] : Mr. Zazueta is a 62-year-old man with history of hypertension, hyperlipidemia, and BPH presenting for follow up.\par \par Patient previously followed with Dr. Ramírez and was last seen in office 11/2021.\par He was recently admitted for evaluation of chest pain that began 2-3 days after lifting a heavy object with his left arm, described as constant at 2/10 and worsening to 10/10 at times, relieving only with pain medicine. He was ruled out for AMI and taken for CCTA which showed mod-severe ramus disease as below. LHC was performed and showed no obstructive CAD. He was empirically started on amlodipine, imdur (discontinued subsequently 2/2 headaches), metoprolol, and PPI.\par \par Today, he reports no significant improvement of symptoms. He is planned for GI evaluation with endoscopy.\par \par LHC 1/2023 post CCTA\par - 40% ostial d2, mild disease throughout, LVEDP <5mmHg\par \par CCTA 1/2023\par -  primarily in the LAD distribution\par - CAD-RADS 3/4A: ramus with focal moderate-to-severe narrowing proximally; calcified plaque within the mLAD and pD1 resulting in mild-to=moderate stenosis; RCA with calcified plaque resulting in minimal narrowing\par \par TTE 1/2023\par - EF 62%, G1DD, mild LA dilatation, no significant valvular disease\par \par Exercise Stress Echo 2017\par - Normal resting study, no ischemia.\par \par Carotid doppler 2017\par - Mild bilateral ICA stenoses <50%\par \par Labs:\par - , HDL 48, TG 87, LDL 73, non-HDL 90\par - hsCRP 1.2, A1c 5.4%, Cr 0.8, K 4.6, TSH 0.47

## 2023-01-18 DIAGNOSIS — Z82.49 FAMILY HISTORY OF ISCHEMIC HEART DISEASE AND OTHER DISEASES OF THE CIRCULATORY SYSTEM: ICD-10-CM

## 2023-01-18 DIAGNOSIS — I25.110 ATHEROSCLEROTIC HEART DISEASE OF NATIVE CORONARY ARTERY WITH UNSTABLE ANGINA PECTORIS: ICD-10-CM

## 2023-01-18 DIAGNOSIS — R00.1 BRADYCARDIA, UNSPECIFIED: ICD-10-CM

## 2023-01-18 DIAGNOSIS — I10 ESSENTIAL (PRIMARY) HYPERTENSION: ICD-10-CM

## 2023-01-18 DIAGNOSIS — Z79.82 LONG TERM (CURRENT) USE OF ASPIRIN: ICD-10-CM

## 2023-01-18 DIAGNOSIS — Z87.891 PERSONAL HISTORY OF NICOTINE DEPENDENCE: ICD-10-CM

## 2023-01-18 DIAGNOSIS — E78.5 HYPERLIPIDEMIA, UNSPECIFIED: ICD-10-CM

## 2023-07-10 ENCOUNTER — APPOINTMENT (OUTPATIENT)
Dept: CARDIOLOGY | Facility: CLINIC | Age: 63
End: 2023-07-10
Payer: COMMERCIAL

## 2023-07-10 VITALS
HEART RATE: 62 BPM | DIASTOLIC BLOOD PRESSURE: 82 MMHG | BODY MASS INDEX: 42.8 KG/M2 | SYSTOLIC BLOOD PRESSURE: 132 MMHG | WEIGHT: 218 LBS | HEIGHT: 60 IN

## 2023-07-10 PROCEDURE — 93000 ELECTROCARDIOGRAM COMPLETE: CPT

## 2023-07-10 PROCEDURE — 99213 OFFICE O/P EST LOW 20 MIN: CPT | Mod: 25

## 2023-07-10 RX ORDER — ATORVASTATIN CALCIUM 20 MG/1
20 TABLET, FILM COATED ORAL
Qty: 90 | Refills: 3 | Status: DISCONTINUED | COMMUNITY
Start: 2021-04-01 | End: 2023-07-10

## 2023-07-11 NOTE — ASSESSMENT
[FreeTextEntry1] : Mr. Zazueta is a 62-year-old man with history of non-obstructive CAD, hypertension, hyperlipidemia, and BPH presenting for follow up.\par \par Impression:\par (1) Non-obstructive CAD by [facility] 1/2023. Chest pain is unlikely cardiac in etiology.\par (2) HTN\par (3) HLD, LDL 70s, goal <70\par \par Plan:\par - Discussed results of all imaging studies and labs\par - Continue aspirin and statin\par - Continue beta blocker\par - Continue HCTZ and amlodipine for HTN\par \par RTC 6-12 months or sooner as needed

## 2023-07-11 NOTE — HISTORY OF PRESENT ILLNESS
[FreeTextEntry1] : Mr. Zazueta is a 62-year-old man with history of hypertension, hyperlipidemia, and BPH presenting for follow up.\par \par Patient previously followed with Dr. Ramírez and was last seen in office 11/2021.\par He was recently admitted for evaluation of chest pain that began 2-3 days after lifting a heavy object with his left arm, described as constant at 2/10 and worsening to 10/10 at times, relieving only with pain medicine. He was ruled out for AMI and taken for CCTA which showed mod-severe ramus disease as below. LHC was performed and showed no obstructive CAD. He was empirically started on amlodipine, imdur (discontinued subsequently 2/2 headaches), metoprolol, and PPI.\par \par LHC 1/2023 post CCTA\par - 40% ostial d2, mild disease throughout, LVEDP <5mmHg\par \par CCTA 1/2023\par -  primarily in the LAD distribution\par - CAD-RADS 3/4A: ramus with focal moderate-to-severe narrowing proximally; calcified plaque within the mLAD and pD1 resulting in mild-to=moderate stenosis; RCA with calcified plaque resulting in minimal narrowing\par \par TTE 1/2023\par - EF 62%, G1DD, mild LA dilatation, no significant valvular disease\par \par Exercise Stress Echo 2017\par - Normal resting study, no ischemia.\par \par Carotid doppler 2017\par - Mild bilateral ICA stenoses <50%\par \par Labs:\par - , HDL 54, LDL 72, TG 86\par - hsCRP 0.9, A1c 5.3%, TSH 0.58\par - Cr 0.88, K 4.9

## 2023-12-06 ENCOUNTER — APPOINTMENT (OUTPATIENT)
Dept: UROLOGY | Facility: CLINIC | Age: 63
End: 2023-12-06
Payer: COMMERCIAL

## 2023-12-06 ENCOUNTER — APPOINTMENT (OUTPATIENT)
Dept: UROLOGY | Facility: CLINIC | Age: 63
End: 2023-12-06

## 2023-12-06 VITALS
WEIGHT: 208 LBS | BODY MASS INDEX: 29.12 KG/M2 | SYSTOLIC BLOOD PRESSURE: 140 MMHG | DIASTOLIC BLOOD PRESSURE: 78 MMHG | TEMPERATURE: 97.7 F | HEART RATE: 65 BPM | HEIGHT: 71 IN

## 2023-12-06 DIAGNOSIS — N40.1 BENIGN PROSTATIC HYPERPLASIA WITH LOWER URINARY TRACT SYMPMS: ICD-10-CM

## 2023-12-06 DIAGNOSIS — R35.0 FREQUENCY OF MICTURITION: ICD-10-CM

## 2023-12-06 DIAGNOSIS — N13.8 BENIGN PROSTATIC HYPERPLASIA WITH LOWER URINARY TRACT SYMPMS: ICD-10-CM

## 2023-12-06 PROCEDURE — 99213 OFFICE O/P EST LOW 20 MIN: CPT

## 2024-05-07 ENCOUNTER — APPOINTMENT (OUTPATIENT)
Dept: CARDIOLOGY | Facility: CLINIC | Age: 64
End: 2024-05-07
Payer: COMMERCIAL

## 2024-05-07 ENCOUNTER — OUTPATIENT (OUTPATIENT)
Dept: OUTPATIENT SERVICES | Facility: HOSPITAL | Age: 64
LOS: 1 days | Discharge: HOME | End: 2024-05-07
Payer: COMMERCIAL

## 2024-05-07 ENCOUNTER — APPOINTMENT (OUTPATIENT)
Dept: SLEEP CENTER | Facility: HOSPITAL | Age: 64
End: 2024-05-07
Payer: COMMERCIAL

## 2024-05-07 VITALS
DIASTOLIC BLOOD PRESSURE: 90 MMHG | HEART RATE: 78 BPM | HEIGHT: 71 IN | WEIGHT: 223 LBS | BODY MASS INDEX: 31.22 KG/M2 | SYSTOLIC BLOOD PRESSURE: 120 MMHG

## 2024-05-07 DIAGNOSIS — R29.818 OTHER SYMPTOMS AND SIGNS INVOLVING THE NERVOUS SYSTEM: ICD-10-CM

## 2024-05-07 DIAGNOSIS — I25.10 ATHEROSCLEROTIC HEART DISEASE OF NATIVE CORONARY ARTERY W/OUT ANGINA PECTORIS: ICD-10-CM

## 2024-05-07 DIAGNOSIS — I10 ESSENTIAL (PRIMARY) HYPERTENSION: ICD-10-CM

## 2024-05-07 DIAGNOSIS — E78.5 HYPERLIPIDEMIA, UNSPECIFIED: ICD-10-CM

## 2024-05-07 DIAGNOSIS — Z00.00 ENCOUNTER FOR GENERAL ADULT MEDICAL EXAMINATION W/OUT ABNORMAL FINDINGS: ICD-10-CM

## 2024-05-07 PROCEDURE — G2211 COMPLEX E/M VISIT ADD ON: CPT

## 2024-05-07 PROCEDURE — 95800 SLP STDY UNATTENDED: CPT | Mod: 26

## 2024-05-07 PROCEDURE — 93000 ELECTROCARDIOGRAM COMPLETE: CPT

## 2024-05-07 PROCEDURE — 95800 SLP STDY UNATTENDED: CPT

## 2024-05-07 PROCEDURE — 99214 OFFICE O/P EST MOD 30 MIN: CPT

## 2024-05-07 RX ORDER — ROSUVASTATIN CALCIUM 20 MG/1
20 TABLET, FILM COATED ORAL
Qty: 90 | Refills: 3 | Status: DISCONTINUED | COMMUNITY
Start: 2023-07-10 | End: 2024-05-07

## 2024-05-07 RX ORDER — OLMESARTAN MEDOXOMIL 20 MG/1
20 TABLET, FILM COATED ORAL DAILY
Qty: 90 | Refills: 3 | Status: ACTIVE | COMMUNITY
Start: 2024-05-07 | End: 1900-01-01

## 2024-05-07 RX ORDER — HYDROCHLOROTHIAZIDE 12.5 MG/1
12.5 TABLET ORAL
Qty: 90 | Refills: 3 | Status: DISCONTINUED | COMMUNITY
Start: 2021-04-01 | End: 2024-05-07

## 2024-05-07 NOTE — ASSESSMENT
[FreeTextEntry1] : Mr. Zazueta is a 62-year-old man with history of non-obstructive CAD, hypertension, hyperlipidemia, and BPH presenting for follow up.  Impression: (1) Non-obstructive CAD by [facility] 1/2023. Chest pain is unlikely cardiac in etiology. (2) HTN (3) HLD, poorly controlled 2/2 statin non-adherence, goal <70 (4) Suspected sleep apnea  Plan: - Discussed results of all imaging studies and labs - Continue aspirin and resume statin - Self discontinued beta blocker, no anginal complaints, OK to hold - Feels he urinates too much with HCTZ; switch to olmesartan 20mg. - Home sleep apnea testing  RTC 6-12 months or sooner as needed

## 2024-05-07 NOTE — HISTORY OF PRESENT ILLNESS
[FreeTextEntry1] : Mr. Zazueta is a 63-year-old man with history of hypertension, hyperlipidemia, and BPH presenting for follow up.  Patient previously followed with Dr. Ramírez and was last seen by him in office 11/2021. He was recently admitted for evaluation of chest pain that began 2-3 days after lifting a heavy object with his left arm, described as constant at 2/10 and worsening to 10/10 at times, relieving only with pain medicine. He was ruled out for AMI and taken for CCTA which showed mod-severe ramus disease as below. LHC was performed and showed no obstructive CAD. He was empirically started on amlodipine, imdur (discontinued subsequently 2/2 headaches), metoprolol, and PPI.  Today reports feeling well. Stopped taking his statin. Home BPs are elevated.  LHC 1/2023 post CCTA - 40% ostial d2, mild disease throughout, LVEDP <5mmHg  CCTA 1/2023 -  primarily in the LAD distribution - CAD-RADS 3/4A: ramus with focal moderate-to-severe narrowing proximally; calcified plaque within the mLAD and pD1 resulting in mild-to=moderate stenosis; RCA with calcified plaque resulting in minimal narrowing  TTE 1/2023 - EF 62%, G1DD, mild LA dilatation, no significant valvular disease  Exercise Stress Echo 2017 - Normal resting study, no ischemia.  Carotid doppler 2017 - Mild bilateral ICA stenoses <50%  Labs: - , HDL 54, LDL 72, TG 86 -> , HDL 55, , , non- - hsCRP 0.9, A1c 5.6%, TSH 0.45, pBNP 42, CRP <3 - Cr 0.95, K 5.3, normal transaminases

## 2024-05-09 DIAGNOSIS — G47.33 OBSTRUCTIVE SLEEP APNEA (ADULT) (PEDIATRIC): ICD-10-CM

## 2024-05-10 DIAGNOSIS — G47.33 OBSTRUCTIVE SLEEP APNEA (ADULT) (PEDIATRIC): ICD-10-CM

## 2024-06-11 RX ORDER — ROSUVASTATIN CALCIUM 20 MG/1
20 TABLET, FILM COATED ORAL
Qty: 14 | Refills: 0 | Status: ACTIVE | COMMUNITY
Start: 2024-05-07 | End: 1900-01-01

## 2024-06-21 ENCOUNTER — APPOINTMENT (OUTPATIENT)
Dept: PULMONOLOGY | Facility: CLINIC | Age: 64
End: 2024-06-21

## 2024-07-10 ENCOUNTER — APPOINTMENT (OUTPATIENT)
Dept: CARDIOLOGY | Facility: CLINIC | Age: 64
End: 2024-07-10

## 2024-11-11 ENCOUNTER — APPOINTMENT (OUTPATIENT)
Dept: CARDIOLOGY | Facility: CLINIC | Age: 64
End: 2024-11-11
Payer: MEDICARE

## 2024-11-11 ENCOUNTER — NON-APPOINTMENT (OUTPATIENT)
Age: 64
End: 2024-11-11

## 2024-11-11 VITALS
WEIGHT: 219 LBS | DIASTOLIC BLOOD PRESSURE: 84 MMHG | HEART RATE: 62 BPM | BODY MASS INDEX: 30.66 KG/M2 | OXYGEN SATURATION: 95 % | HEIGHT: 71 IN | SYSTOLIC BLOOD PRESSURE: 120 MMHG

## 2024-11-11 DIAGNOSIS — I10 ESSENTIAL (PRIMARY) HYPERTENSION: ICD-10-CM

## 2024-11-11 DIAGNOSIS — I25.10 ATHEROSCLEROTIC HEART DISEASE OF NATIVE CORONARY ARTERY W/OUT ANGINA PECTORIS: ICD-10-CM

## 2024-11-11 DIAGNOSIS — E78.5 HYPERLIPIDEMIA, UNSPECIFIED: ICD-10-CM

## 2024-11-11 PROCEDURE — 99204 OFFICE O/P NEW MOD 45 MIN: CPT

## 2024-11-11 PROCEDURE — 93000 ELECTROCARDIOGRAM COMPLETE: CPT

## 2024-12-10 ENCOUNTER — APPOINTMENT (OUTPATIENT)
Dept: UROLOGY | Facility: CLINIC | Age: 64
End: 2024-12-10
Payer: MEDICARE

## 2024-12-10 LAB
BILIRUB UR QL STRIP: NORMAL
COLLECTION METHOD: NORMAL
GLUCOSE UR-MCNC: NORMAL
HCG UR QL: 0.2 EU/DL
HGB UR QL STRIP.AUTO: NORMAL
KETONES UR-MCNC: NORMAL
LEUKOCYTE ESTERASE UR QL STRIP: NORMAL
NITRITE UR QL STRIP: NORMAL
PH UR STRIP: 7
PROT UR STRIP-MCNC: NORMAL
SP GR UR STRIP: 1.02

## 2024-12-10 PROCEDURE — G2211 COMPLEX E/M VISIT ADD ON: CPT

## 2024-12-10 PROCEDURE — 99204 OFFICE O/P NEW MOD 45 MIN: CPT

## 2024-12-10 RX ORDER — TADALAFIL 5 MG/1
5 TABLET ORAL
Qty: 90 | Refills: 3 | Status: ACTIVE | OUTPATIENT
Start: 2024-12-10

## 2024-12-12 ENCOUNTER — APPOINTMENT (OUTPATIENT)
Dept: CARDIOLOGY | Facility: CLINIC | Age: 64
End: 2024-12-12
Payer: MEDICARE

## 2024-12-12 DIAGNOSIS — I25.10 ATHEROSCLEROTIC HEART DISEASE OF NATIVE CORONARY ARTERY W/OUT ANGINA PECTORIS: ICD-10-CM

## 2024-12-12 PROCEDURE — 93351 STRESS TTE COMPLETE: CPT

## 2024-12-12 PROCEDURE — 93320 DOPPLER ECHO COMPLETE: CPT

## 2024-12-12 PROCEDURE — 93325 DOPPLER ECHO COLOR FLOW MAPG: CPT

## 2024-12-16 ENCOUNTER — APPOINTMENT (OUTPATIENT)
Dept: CARDIOLOGY | Facility: CLINIC | Age: 64
End: 2024-12-16

## 2025-02-20 NOTE — PATIENT PROFILE ADULT - OVER THE PAST TWO WEEKS HAVE YOU FELT DOWN, DEPRESSED OR HOPELESS?
Dr. Bruce would like to see you back for follow up in 3 Months    Any medications prescribed or refilled today may be picked up your preferred pharmacy. Please continue your current medications as prescribed.    Please contact our office at [phone] with any questions or concerns prior to your next appointment.    It was nice seeing you today. Take care!    While [employer] access is available 24 hours a day 7 days a week, messages sent to your provider on [employer] are addressed only during normal clinic hours. We apologize for any inconvenience this may cause. If you have an urgent need outside of these hours we recommend calling [phone]. Our clinic hours are as follows:     Monday 8 AM - 4:30 PM   Tuesday 8 AM - 4:30 PM   Wednesday 8 AM to 4:30 PM   Thursday 8 AM to 4:30 PM   Friday 8 AM to 4:30 PM   
no

## 2025-03-27 NOTE — HISTORY OF PRESENT ILLNESS
[FreeTextEntry1] : 59-year-old with history of BPH here for routine followup and prostate check. He has no urinary complaints. PSA one year ago 0.5.
spouse

## 2025-04-21 ENCOUNTER — RX RENEWAL (OUTPATIENT)
Age: 65
End: 2025-04-21

## 2025-05-14 ENCOUNTER — NON-APPOINTMENT (OUTPATIENT)
Age: 65
End: 2025-05-14

## 2025-05-15 ENCOUNTER — APPOINTMENT (OUTPATIENT)
Dept: CARDIOLOGY | Facility: CLINIC | Age: 65
End: 2025-05-15
Payer: MEDICARE

## 2025-05-15 VITALS
SYSTOLIC BLOOD PRESSURE: 137 MMHG | HEIGHT: 71 IN | HEART RATE: 68 BPM | WEIGHT: 220 LBS | BODY MASS INDEX: 30.8 KG/M2 | DIASTOLIC BLOOD PRESSURE: 81 MMHG

## 2025-05-15 DIAGNOSIS — E78.5 HYPERLIPIDEMIA, UNSPECIFIED: ICD-10-CM

## 2025-05-15 DIAGNOSIS — G47.33 OBSTRUCTIVE SLEEP APNEA (ADULT) (PEDIATRIC): ICD-10-CM

## 2025-05-15 DIAGNOSIS — I10 ESSENTIAL (PRIMARY) HYPERTENSION: ICD-10-CM

## 2025-05-15 DIAGNOSIS — I25.10 ATHEROSCLEROTIC HEART DISEASE OF NATIVE CORONARY ARTERY W/OUT ANGINA PECTORIS: ICD-10-CM

## 2025-05-15 PROCEDURE — G2211 COMPLEX E/M VISIT ADD ON: CPT

## 2025-05-15 PROCEDURE — 93000 ELECTROCARDIOGRAM COMPLETE: CPT

## 2025-05-15 PROCEDURE — 99214 OFFICE O/P EST MOD 30 MIN: CPT

## 2025-05-15 RX ORDER — TIRZEPATIDE 2.5 MG/.5ML
2.5 INJECTION, SOLUTION SUBCUTANEOUS
Qty: 1 | Refills: 3 | Status: ACTIVE | COMMUNITY
Start: 2025-05-15 | End: 1900-01-01